# Patient Record
Sex: MALE | Race: WHITE | NOT HISPANIC OR LATINO | Employment: STUDENT | URBAN - METROPOLITAN AREA
[De-identification: names, ages, dates, MRNs, and addresses within clinical notes are randomized per-mention and may not be internally consistent; named-entity substitution may affect disease eponyms.]

---

## 2018-05-16 ENCOUNTER — HOSPITAL ENCOUNTER (INPATIENT)
Facility: HOSPITAL | Age: 23
LOS: 6 days | Discharge: HOME | DRG: 885 | End: 2018-05-22
Attending: PSYCHIATRY & NEUROLOGY | Admitting: PSYCHIATRY & NEUROLOGY
Payer: MEDICAID

## 2018-05-16 DIAGNOSIS — F32.A DEPRESSION WITH SUICIDAL IDEATION: Primary | ICD-10-CM

## 2018-05-16 DIAGNOSIS — R45.851 DEPRESSION WITH SUICIDAL IDEATION: Primary | ICD-10-CM

## 2018-05-16 PROBLEM — Z86.59 HISTORY OF PSYCHOSIS: Status: ACTIVE | Noted: 2018-05-16

## 2018-05-16 PROBLEM — F90.2 ATTENTION DEFICIT HYPERACTIVITY DISORDER (ADHD), COMBINED TYPE: Status: ACTIVE | Noted: 2018-05-16

## 2018-05-16 PROBLEM — F32.9 REACTIVE DEPRESSION: Status: ACTIVE | Noted: 2018-05-16

## 2018-05-16 PROBLEM — E03.8 OTHER SPECIFIED HYPOTHYROIDISM: Status: ACTIVE | Noted: 2018-05-16

## 2018-05-16 PROBLEM — F41.9 ANXIETY: Status: ACTIVE | Noted: 2018-05-16

## 2018-05-16 PROCEDURE — 99223 1ST HOSP IP/OBS HIGH 75: CPT | Mod: ,,, | Performed by: PSYCHIATRY & NEUROLOGY

## 2018-05-16 PROCEDURE — 11400000 HC PSYCH PRIVATE ROOM

## 2018-05-16 PROCEDURE — 25000003 PHARM REV CODE 250: Performed by: PSYCHIATRY & NEUROLOGY

## 2018-05-16 PROCEDURE — 97802 MEDICAL NUTRITION INDIV IN: CPT

## 2018-05-16 PROCEDURE — 99231 SBSQ HOSP IP/OBS SF/LOW 25: CPT | Mod: ,,, | Performed by: NURSE PRACTITIONER

## 2018-05-16 PROCEDURE — 90833 PSYTX W PT W E/M 30 MIN: CPT | Mod: ,,, | Performed by: PSYCHIATRY & NEUROLOGY

## 2018-05-16 RX ORDER — ARIPIPRAZOLE 5 MG/1
15 TABLET ORAL DAILY
Status: DISCONTINUED | OUTPATIENT
Start: 2018-05-16 | End: 2018-05-21

## 2018-05-16 RX ORDER — HYDROXYZINE PAMOATE 50 MG/1
50 CAPSULE ORAL EVERY 6 HOURS PRN
Status: DISCONTINUED | OUTPATIENT
Start: 2018-05-16 | End: 2018-05-22 | Stop reason: HOSPADM

## 2018-05-16 RX ORDER — ACETAMINOPHEN 325 MG/1
650 TABLET ORAL EVERY 6 HOURS PRN
Status: DISCONTINUED | OUTPATIENT
Start: 2018-05-16 | End: 2018-05-22 | Stop reason: HOSPADM

## 2018-05-16 RX ORDER — LEVOTHYROXINE SODIUM 25 UG/1
25 TABLET ORAL
Status: DISCONTINUED | OUTPATIENT
Start: 2018-05-17 | End: 2018-05-22 | Stop reason: HOSPADM

## 2018-05-16 RX ORDER — FOLIC ACID 1 MG/1
1 TABLET ORAL DAILY
Status: DISCONTINUED | OUTPATIENT
Start: 2018-05-16 | End: 2018-05-22 | Stop reason: HOSPADM

## 2018-05-16 RX ORDER — MAG HYDROX/ALUMINUM HYD/SIMETH 200-200-20
30 SUSPENSION, ORAL (FINAL DOSE FORM) ORAL EVERY 6 HOURS PRN
Status: DISCONTINUED | OUTPATIENT
Start: 2018-05-16 | End: 2018-05-22 | Stop reason: HOSPADM

## 2018-05-16 RX ORDER — LOPERAMIDE HYDROCHLORIDE 2 MG/1
2 CAPSULE ORAL
Status: DISCONTINUED | OUTPATIENT
Start: 2018-05-16 | End: 2018-05-22 | Stop reason: HOSPADM

## 2018-05-16 RX ORDER — DOCUSATE SODIUM 100 MG/1
100 CAPSULE, LIQUID FILLED ORAL DAILY PRN
Status: DISCONTINUED | OUTPATIENT
Start: 2018-05-16 | End: 2018-05-22 | Stop reason: HOSPADM

## 2018-05-16 RX ORDER — VENLAFAXINE HYDROCHLORIDE 75 MG/1
75 CAPSULE, EXTENDED RELEASE ORAL DAILY
Status: DISCONTINUED | OUTPATIENT
Start: 2018-05-16 | End: 2018-05-21

## 2018-05-16 RX ORDER — OLANZAPINE 10 MG/1
10 TABLET ORAL EVERY 4 HOURS PRN
Status: DISCONTINUED | OUTPATIENT
Start: 2018-05-16 | End: 2018-05-22 | Stop reason: HOSPADM

## 2018-05-16 RX ORDER — OLANZAPINE 10 MG/2ML
10 INJECTION, POWDER, FOR SOLUTION INTRAMUSCULAR EVERY 4 HOURS PRN
Status: DISCONTINUED | OUTPATIENT
Start: 2018-05-16 | End: 2018-05-22 | Stop reason: HOSPADM

## 2018-05-16 RX ADMIN — ARIPIPRAZOLE 15 MG: 5 TABLET ORAL at 02:05

## 2018-05-16 RX ADMIN — THERA TABS 1 TABLET: TAB at 12:05

## 2018-05-16 RX ADMIN — VENLAFAXINE HYDROCHLORIDE 75 MG: 75 CAPSULE, EXTENDED RELEASE ORAL at 02:05

## 2018-05-16 RX ADMIN — FOLIC ACID 1 MG: 1 TABLET ORAL at 12:05

## 2018-05-16 RX ADMIN — HYDROXYZINE PAMOATE 50 MG: 50 CAPSULE ORAL at 09:05

## 2018-05-16 NOTE — SUBJECTIVE & OBJECTIVE
Past Medical History:   Diagnosis Date    ADHD     Anxiety     Autism     Depression     Disruptive behavior disorder     History of psychiatric hospitalization     Hx of psychiatric care     Psychiatric problem     Suicide attempt     Therapy        Past Surgical History:   Procedure Laterality Date    EYE SURGERY         Review of patient's allergies indicates:   Allergen Reactions    Dilantin [phenytoin sodium extended] Rash       Current Facility-Administered Medications on File Prior to Encounter   Medication    [DISCONTINUED] acetaminophen tablet 650 mg    [DISCONTINUED] diphenhydrAMINE injection 50 mg    [DISCONTINUED] haloperidol lactate injection 5 mg    [DISCONTINUED] lorazepam injection 2 mg     Current Outpatient Prescriptions on File Prior to Encounter   Medication Sig    ARIPiprazole (ABILIFY) 10 MG Tab Take 10 mg by mouth once daily.     doxycycline (DORYX) 100 MG EC tablet Take 100 mg by mouth once daily.    FLUoxetine (PROZAC) 40 MG capsule Take 40 mg by mouth once daily.    lisdexamfetamine (VYVANSE) 70 MG capsule Take 70 mg by mouth every morning.     Family History     Problem Relation (Age of Onset)    Bipolar disorder Mother        Social History Main Topics    Smoking status: Never Smoker    Smokeless tobacco: Never Used    Alcohol use No    Drug use: No    Sexual activity: Not on file     Review of Systems   Constitutional: Negative for chills and fever.   HENT: Negative for congestion, postnasal drip and sore throat.    Eyes: Negative for photophobia.   Respiratory: Negative for chest tightness and shortness of breath.    Cardiovascular: Negative for chest pain.   Gastrointestinal: Negative for abdominal distention, abdominal pain, blood in stool and vomiting.   Genitourinary: Negative for dysuria, flank pain and hematuria.   Musculoskeletal: Negative for back pain.   Skin: Negative for pallor.   Neurological: Negative for dizziness, seizures, facial asymmetry,  speech difficulty and numbness.   Hematological: Does not bruise/bleed easily.   Psychiatric/Behavioral: Negative for agitation and suicidal ideas. The patient is not nervous/anxious.      Objective:     Vital Signs (Most Recent):  Temp: 97.4 °F (36.3 °C) (05/16/18 1039)  Pulse: 82 (05/16/18 1039)  Resp: 20 (05/16/18 1039)  BP: 108/73 (05/16/18 1039) Vital Signs (24h Range):  Temp:  [97.4 °F (36.3 °C)-97.6 °F (36.4 °C)] 97.4 °F (36.3 °C)  Pulse:  [78-82] 82  Resp:  [18-20] 20  SpO2:  [99 %] 99 %  BP: (108-122)/(69-79) 108/73     Weight: 80.3 kg (177 lb)  Body mass index is 30.38 kg/m².    Physical Exam   Constitutional: He is oriented to person, place, and time. He appears well-developed and well-nourished.   HENT:   Head: Normocephalic and atraumatic.   Neck: Normal range of motion. Neck supple. No thyromegaly present.   Cardiovascular: Normal rate, regular rhythm, normal heart sounds and intact distal pulses.    No murmur heard.  Pulmonary/Chest: Effort normal and breath sounds normal. No respiratory distress. He has no wheezes. He has no rales.   Abdominal: Soft. Bowel sounds are normal. He exhibits no distension. There is no tenderness.   Musculoskeletal: Normal range of motion. He exhibits no edema or deformity.   Neurological: He is alert and oriented to person, place, and time.   Neuro: Cranial nerves:  CN II Visual fields full to confrontation.   CN III, IV, VI Pupils are equal, round, and reactive to light.  CN III: no palsy  Nystagmus: none   Diplopia: none  Ophthalmoparesis: none  CN V Facial sensation intact.   CN VII Facial expression full, symmetric.   CN VIII normal.   CN IX normal.   CN X normal.   CN XI normal.   CN XII normal.     Skin: Skin is warm and dry.   Psychiatric: He has a normal mood and affect. His behavior is normal. Thought content normal.   Nursing note and vitals reviewed.      Significant Labs:  U/A  Results for orders placed or performed during the hospital encounter of 05/15/18    Urinalysis   Result Value Ref Range    Specimen UA Urine, Clean Catch     Color, UA Yellow Yellow, Straw, Sangita    Appearance, UA Hazy (A) Clear    pH, UA 7.0 5.0 - 8.0    Specific Gravity, UA 1.025 1.005 - 1.030    Protein, UA Negative Negative    Glucose, UA Negative Negative    Ketones, UA Negative Negative    Bilirubin (UA) Negative Negative    Occult Blood UA Negative Negative    Nitrite, UA Negative Negative    Urobilinogen, UA Negative <2.0 EU/dL    Leukocytes, UA Negative Negative     UDS  Results for orders placed or performed during the hospital encounter of 05/15/18   Drug screen panel, emergency   Result Value Ref Range    Benzodiazepines Negative     Methadone metabolites Negative     Cocaine (Metab.) Negative     Opiate Scrn, Ur Negative     Barbiturate Screen, Ur Negative     Amphetamine Screen, Ur Presumptive Positive     THC Negative     Phencyclidine Negative     Creatinine, Random Ur 196.5 23.0 - 375.0 mg/dL    Toxicology Information SEE COMMENT      CBC  Results for orders placed or performed during the hospital encounter of 05/15/18   CBC auto differential   Result Value Ref Range    WBC 7.51 3.90 - 12.70 K/uL    RBC 5.68 4.60 - 6.20 M/uL    Hemoglobin 16.5 14.0 - 18.0 g/dL    Hematocrit 46.7 40.0 - 54.0 %    MCV 82 82 - 98 fL    MCH 29.0 27.0 - 31.0 pg    MCHC 35.3 32.0 - 36.0 g/dL    RDW 12.5 11.5 - 14.5 %    Platelets 236 150 - 350 K/uL    MPV 9.8 9.2 - 12.9 fL    Gran # (ANC) 4.3 1.8 - 7.7 K/uL    Lymph # 2.3 1.0 - 4.8 K/uL    Mono # 0.8 0.3 - 1.0 K/uL    Eos # 0.1 0.0 - 0.5 K/uL    Baso # 0.01 0.00 - 0.20 K/uL    Gran% 57.7 38.0 - 73.0 %    Lymph% 30.6 18.0 - 48.0 %    Mono% 10.1 4.0 - 15.0 %    Eosinophil% 1.5 0.0 - 8.0 %    Basophil% 0.1 0.0 - 1.9 %    Differential Method Automated      CMP  Results for orders placed or performed during the hospital encounter of 05/15/18   Comprehensive metabolic panel   Result Value Ref Range    Sodium 143 136 - 145 mmol/L    Potassium 4.4 3.5 - 5.1  mmol/L    Chloride 109 95 - 110 mmol/L    CO2 24 23 - 29 mmol/L    Glucose 81 70 - 110 mg/dL    BUN, Bld 16 6 - 20 mg/dL    Creatinine 1.1 0.5 - 1.4 mg/dL    Calcium 9.9 8.7 - 10.5 mg/dL    Total Protein 7.6 6.0 - 8.4 g/dL    Albumin 4.3 3.5 - 5.2 g/dL    Total Bilirubin 0.8 0.1 - 1.0 mg/dL    Alkaline Phosphatase 50 (L) 55 - 135 U/L    AST 32 10 - 40 U/L    ALT 42 10 - 44 U/L    Anion Gap 10 8 - 16 mmol/L    eGFR if African American >60 >60 mL/min/1.73 m^2    eGFR if non African American >60 >60 mL/min/1.73 m^2     TSH  Results for orders placed or performed during the hospital encounter of 05/15/18   TSH   Result Value Ref Range    TSH 5.438 (H) 0.400 - 4.000 uIU/mL     ETOH  Results for orders placed or performed during the hospital encounter of 05/15/18   Ethanol   Result Value Ref Range    Alcohol, Medical, Serum <10 <10 mg/dL     Salicylate  Results for orders placed or performed during the hospital encounter of 05/15/18   Salicylate level   Result Value Ref Range    Salicylate Lvl <5.0 (L) 15.0 - 30.0 mg/dL     Acetaminophen  Results for orders placed or performed during the hospital encounter of 05/15/18   Acetaminophen level   Result Value Ref Range    Acetaminophen (Tylenol), Serum <3.0 (L) 10.0 - 20.0 ug/mL

## 2018-05-16 NOTE — CONSULTS
[facility] Hospital Medicine  Consult Note    Patient Name: Addison Sarmiento  MRN: [id number]  Admission Date: 5/16/2018  Hospital Length of Stay: 0 days  Attending Physician: Rubin Bernard MD   Primary Care Provider: Blanche Leach MD           Patient information was obtained from patient and ER records.     Inpatient consult to [facility] for History and Physical  Consult performed by: MEKA PAULSON  Consult ordered by: RUBIN BERNARD        Subjective:     Principal Problem: <principal problem not specified>    Chief Complaint: No chief complaint on file.       HPI: Pt presented to ER with C/o suicidal ideation. Admitted to [facility]. Medicine consulted for H/P. VSS/afebrile. labs reviewed +amphetamines    Past Medical History:   Diagnosis Date    ADHD     Anxiety     Autism     Depression     Disruptive behavior disorder     History of psychiatric hospitalization     Hx of psychiatric care     Psychiatric problem     Suicide attempt     Therapy        Past Surgical History:   Procedure Laterality Date    EYE SURGERY         Review of patient's allergies indicates:   Allergen Reactions    Dilantin [phenytoin sodium extended] Rash       Current Facility-Administered Medications on File Prior to Encounter   Medication    [DISCONTINUED] acetaminophen tablet 650 mg    [DISCONTINUED] diphenhydrAMINE injection 50 mg    [DISCONTINUED] haloperidol lactate injection 5 mg    [DISCONTINUED] lorazepam injection 2 mg     Current Outpatient Prescriptions on File Prior to Encounter   Medication Sig    ARIPiprazole (ABILIFY) 10 MG Tab Take 10 mg by mouth once daily.     doxycycline (DORYX) 100 MG EC tablet Take 100 mg by mouth once daily.    FLUoxetine (PROZAC) 40 MG capsule Take 40 mg by mouth once daily.    lisdexamfetamine (VYVANSE) 70 MG capsule Take 70 mg by mouth every morning.     Family History     Problem Relation (Age of Onset)    Bipolar disorder Mother         Social History Main Topics    Smoking status: Never Smoker    Smokeless tobacco: Never Used    Alcohol use No    Drug use: No    Sexual activity: Not on file     Review of Systems   Constitutional: Negative for chills and fever.   HENT: Negative for congestion, postnasal drip and sore throat.    Eyes: Negative for photophobia.   Respiratory: Negative for chest tightness and shortness of breath.    Cardiovascular: Negative for chest pain.   Gastrointestinal: Negative for abdominal distention, abdominal pain, blood in stool and vomiting.   Genitourinary: Negative for dysuria, flank pain and hematuria.   Musculoskeletal: Negative for back pain.   Skin: Negative for pallor.   Neurological: Negative for dizziness, seizures, facial asymmetry, speech difficulty and numbness.   Hematological: Does not bruise/bleed easily.   Psychiatric/Behavioral: Negative for agitation and suicidal ideas. The patient is not nervous/anxious.      Objective:     Vital Signs (Most Recent):  Temp: 97.4 °F (36.3 °C) (05/16/18 1039)  Pulse: 82 (05/16/18 1039)  Resp: 20 (05/16/18 1039)  BP: 108/73 (05/16/18 1039) Vital Signs (24h Range):  Temp:  [97.4 °F (36.3 °C)-97.6 °F (36.4 °C)] 97.4 °F (36.3 °C)  Pulse:  [78-82] 82  Resp:  [18-20] 20  SpO2:  [99 %] 99 %  BP: (108-122)/(69-79) 108/73     Weight: 80.3 kg (177 lb)  Body mass index is 30.38 kg/m².    Physical Exam   Constitutional: He is oriented to person, place, and time. He appears well-developed and well-nourished.   HENT:   Head: Normocephalic and atraumatic.   Neck: Normal range of motion. Neck supple. No thyromegaly present.   Cardiovascular: Normal rate, regular rhythm, normal heart sounds and intact distal pulses.    No murmur heard.  Pulmonary/Chest: Effort normal and breath sounds normal. No respiratory distress. He has no wheezes. He has no rales.   Abdominal: Soft. Bowel sounds are normal. He exhibits no distension. There is no tenderness.   Musculoskeletal: Normal range of  motion. He exhibits no edema or deformity.   Neurological: He is alert and oriented to person, place, and time.   Neuro: Cranial nerves:  CN II Visual fields full to confrontation.   CN III, IV, VI Pupils are equal, round, and reactive to light.  CN III: no palsy  Nystagmus: none   Diplopia: none  Ophthalmoparesis: none  CN V Facial sensation intact.   CN VII Facial expression full, symmetric.   CN VIII normal.   CN IX normal.   CN X normal.   CN XI normal.   CN XII normal.     Skin: Skin is warm and dry.   Psychiatric: He has a normal mood and affect. His behavior is normal. Thought content normal.   Nursing note and vitals reviewed.      Significant Labs:  U/A  Results for orders placed or performed during the hospital encounter of 05/15/18   Urinalysis   Result Value Ref Range    Specimen UA Urine, Clean Catch     Color, UA Yellow Yellow, Straw, [place]    Appearance, UA Hazy (A) Clear    pH, UA 7.0 5.0 - 8.0    Specific Gravity, UA 1.025 1.005 - 1.030    Protein, UA Negative Negative    Glucose, UA Negative Negative    Ketones, UA Negative Negative    Bilirubin (UA) Negative Negative    Occult Blood UA Negative Negative    Nitrite, UA Negative Negative    Urobilinogen, UA Negative <2.0 EU/dL    Leukocytes, UA Negative Negative     UDS  Results for orders placed or performed during the hospital encounter of 05/15/18   Drug screen panel, emergency   Result Value Ref Range    Benzodiazepines Negative     Methadone metabolites Negative     Cocaine (Metab.) Negative     Opiate Scrn, Ur Negative     Barbiturate Screen, Ur Negative     Amphetamine Screen, Ur Presumptive Positive     THC Negative     Phencyclidine Negative     Creatinine, Random Ur 196.5 23.0 - 375.0 mg/dL    Toxicology Information SEE COMMENT      CBC  Results for orders placed or performed during the hospital encounter of 05/15/18   CBC auto differential   Result Value Ref Range    WBC 7.51 3.90 - 12.70 K/uL    RBC 5.68 4.60 - 6.20 M/uL    Hemoglobin  16.5 14.0 - 18.0 g/dL    Hematocrit 46.7 40.0 - 54.0 %    MCV 82 82 - 98 fL    MCH 29.0 27.0 - 31.0 pg    MCHC 35.3 32.0 - 36.0 g/dL    RDW 12.5 11.5 - 14.5 %    Platelets 236 150 - 350 K/uL    MPV 9.8 9.2 - 12.9 fL    Gran # (ANC) 4.3 1.8 - 7.7 K/uL    Lymph # 2.3 1.0 - 4.8 K/uL    Mono # 0.8 0.3 - 1.0 K/uL    Eos # 0.1 0.0 - 0.5 K/uL    Baso # 0.01 0.00 - 0.20 K/uL    Gran% 57.7 38.0 - 73.0 %    Lymph% 30.6 18.0 - 48.0 %    Mono% 10.1 4.0 - 15.0 %    Eosinophil% 1.5 0.0 - 8.0 %    Basophil% 0.1 0.0 - 1.9 %    Differential Method Automated      CMP  Results for orders placed or performed during the hospital encounter of 05/15/18   Comprehensive metabolic panel   Result Value Ref Range    Sodium 143 136 - 145 mmol/L    Potassium 4.4 3.5 - 5.1 mmol/L    Chloride 109 95 - 110 mmol/L    CO2 24 23 - 29 mmol/L    Glucose 81 70 - 110 mg/dL    BUN, Bld 16 6 - 20 mg/dL    Creatinine 1.1 0.5 - 1.4 mg/dL    Calcium 9.9 8.7 - 10.5 mg/dL    Total Protein 7.6 6.0 - 8.4 g/dL    Albumin 4.3 3.5 - 5.2 g/dL    Total Bilirubin 0.8 0.1 - 1.0 mg/dL    Alkaline Phosphatase 50 (L) 55 - 135 U/L    AST 32 10 - 40 U/L    ALT 42 10 - 44 U/L    Anion Gap 10 8 - 16 mmol/L    eGFR if African American >60 >60 mL/min/1.73 m^2    eGFR if non African American >60 >60 mL/min/1.73 m^2     TSH  Results for orders placed or performed during the hospital encounter of 05/15/18   TSH   Result Value Ref Range    TSH 5.438 (H) 0.400 - 4.000 uIU/mL     ETOH  Results for orders placed or performed during the hospital encounter of 05/15/18   Ethanol   Result Value Ref Range    Alcohol, Medical, Serum <10 <10 mg/dL     Salicylate  Results for orders placed or performed during the hospital encounter of 05/15/18   Salicylate level   Result Value Ref Range    Salicylate Lvl <5.0 (L) 15.0 - 30.0 mg/dL     Acetaminophen  Results for orders placed or performed during the hospital encounter of 05/15/18   Acetaminophen level   Result Value Ref Range    Acetaminophen  (Tylenol), Serum <3.0 (L) 10.0 - 20.0 ug/mL             Assessment/Plan:     Depression with suicidal ideation    Further orders per psych            VTE Risk Mitigation     None              Thank you for your consult. I will sign off. Please contact us if you have any additional questions.    Annamarie Lombardo NP  Department of Hospital Medicine   [facility]

## 2018-05-16 NOTE — PLAN OF CARE
Problem: Patient Care Overview (Adult)  Goal: Plan of Care Review  Outcome: Ongoing (interventions implemented as appropriate)  Nutrition Goals: adequate po intake >=75% of all meals by discharge  Nutrition Goal Status: new  Communication of RD Recs:  (poc reviewed)    Nutrition Discharge Planning: Regular diet with adequate intake to meet EEN & EPN

## 2018-05-16 NOTE — HPI
Pt presented to ER with C/o suicidal ideation. Admitted to [facility]. Medicine consulted for H/P. VSS/afebrile. labs reviewed +amphetamines

## 2018-05-16 NOTE — CONSULTS
"  [facility]  Adult Nutrition  Consult Note    SUMMARY     Recommendations    Recommendation/Intervention: Continue Regular diet as tolerated encouraging good intake of all meals. Boost plus as needed for intake <50%  Goals: adequate po intake >=75% of all meals by discharge  Nutrition Goal Status: new  Communication of RD Recs:  (poc reviewed)    Nutrition Discharge Planning: Regular diet with adequate intake to meet EEN & EPN    Reason for Assessment    Reason for Assessment: consult  Diagnosis: psychological disorder  Relevant Medical History: Autism, Psyc  General Information Comments: Pt admitted with depression with SI; pt lives in group home.    Nutrition Risk Screen    Nutrition Risk Screen: no indicators present    Nutrition/Diet History    Patient Reported Diet/Restrictions/Preferences: general  Do you have any cultural, spiritual, Sabianism conflicts, given your current situation?: Jain  Food Allergies: NKFA    Anthropometrics    Temp: 97.2 °F (36.2 °C)  Height Method: Measured  Height: 5' 4" (162.6 cm)  Height (inches): 64 in  Weight Method: Standard Scale  Weight: 80.3 kg (177 lb) (rd reviewed)  Weight (lb): 177 lb  Ideal Body Weight (IBW), Male: 130 lb  % Ideal Body Weight, Male (lb): 136.15 lb  BMI (Calculated): 30.4  BMI Grade: 30 - 34.9- obesity - grade I       Lab/Procedures/Meds    Pertinent Labs Reviewed: reviewed  Pertinent Medications Reviewed: reviewed    Physical Findings/Assessment    Overall Physical Appearance: nourished  Tubes:  (-)  Oral/Mouth Cavity: WDL  Skin: intact    Estimated/Assessed Needs    Weight Used For Calorie Calculations: 80.3 kg (177 lb 0.5 oz)  Energy Calorie Requirements (kcal): 1724  Energy Need Method: Brionna-St Weeks (No AF BMI >25)  Protein Requirements: 64-80 (0.8-1.0)  Weight Used For Protein Calculations: 80.3 kg (177 lb 0.5 oz)  Fluid Requirements (mL): 1ml/kcal or per MD     RDA Method (mL): 1724         Nutrition Prescription " Ordered    Current Diet Order: Regular  Nutrition Order Comments: 25% intake (only 1 meal)    Evaluation of Received Nutrient/Fluid Intake    Energy Calories Required: not meeting needs  Protein Required: not meeting needs  Fluid Required: not meeting needs  Tolerance: tolerating  % Intake of Estimated Energy Needs: 25 - 50 %  % Meal Intake: 25 - 50 %    Nutrition Risk    Level of Risk/Frequency of Follow-up:  (F/U 1x/wk)     Assessment and Plan    To be determined    Monitor and Evaluation    Food and Nutrient Intake: food and beverage intake  Food and Nutrient Adminstration: diet order  Knowledge/Beliefs/Attitudes: food and nutrition knowledge/skill  Physical Activity and Function: nutrition-related ADLs and IADLs  Anthropometric Measurements: weight, weight change  Biochemical Data, Medical Tests and Procedures: electrolyte and renal panel  Nutrition-Focused Physical Findings: overall appearance     Nutrition Follow-Up    RD Follow-up?: Yes
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
## 2018-05-17 LAB
CHOLEST SERPL-MCNC: 175 MG/DL
CHOLEST/HDLC SERPL: 5.6 {RATIO}
HDLC SERPL-MCNC: 31 MG/DL
HDLC SERPL: 17.7 %
LDLC SERPL CALC-MCNC: 92.2 MG/DL
NONHDLC SERPL-MCNC: 144 MG/DL
TRIGL SERPL-MCNC: 259 MG/DL

## 2018-05-17 PROCEDURE — 25000003 PHARM REV CODE 250: Performed by: PSYCHIATRY & NEUROLOGY

## 2018-05-17 PROCEDURE — 80061 LIPID PANEL: CPT

## 2018-05-17 PROCEDURE — 36415 COLL VENOUS BLD VENIPUNCTURE: CPT

## 2018-05-17 PROCEDURE — 11400000 HC PSYCH PRIVATE ROOM

## 2018-05-17 PROCEDURE — 99233 SBSQ HOSP IP/OBS HIGH 50: CPT | Mod: ,,, | Performed by: PSYCHIATRY & NEUROLOGY

## 2018-05-17 PROCEDURE — 90833 PSYTX W PT W E/M 30 MIN: CPT | Mod: ,,, | Performed by: PSYCHIATRY & NEUROLOGY

## 2018-05-17 RX ADMIN — LEVOTHYROXINE SODIUM 25 MCG: 25 TABLET ORAL at 05:05

## 2018-05-17 RX ADMIN — HYDROXYZINE PAMOATE 50 MG: 50 CAPSULE ORAL at 08:05

## 2018-05-17 RX ADMIN — ARIPIPRAZOLE 15 MG: 5 TABLET ORAL at 08:05

## 2018-05-17 RX ADMIN — ACETAMINOPHEN 650 MG: 325 TABLET ORAL at 06:05

## 2018-05-17 RX ADMIN — FOLIC ACID 1 MG: 1 TABLET ORAL at 08:05

## 2018-05-17 RX ADMIN — VENLAFAXINE HYDROCHLORIDE 75 MG: 75 CAPSULE, EXTENDED RELEASE ORAL at 08:05

## 2018-05-17 RX ADMIN — THERA TABS 1 TABLET: TAB at 08:05
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
## 2018-05-17 NOTE — PLAN OF CARE
Problem: Patient Care Overview (Adult)  Goal: Plan of Care Review  Outcome: Ongoing (interventions implemented as appropriate)  Shift note : this morning mason was at at the nurses station asking lots of questions . Later he became involved with his peers playing games in the day room . He is eating all meals and taking all ordered  Medications .
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
## 2018-05-17 NOTE — PLAN OF CARE
Problem: Overarching Goals (Adult)  Goal: Optimized Coping Skills in Response to Life Stressors  Group Note  Behavior:  Patient attended Psychotherapy Group and participated. Patient attentive and cooperative.      Intervention:  Relationships- group therapy session with materials prescribed in the Group Treatment for Substance abuse second edition, A Zexdgn-wh-Ovnnvr Therapy  Manual. The materials were used from Precontemplation/Contemplation/ Preparations Session 9  discussing relationships (pp. 115-119). Discussed different types of relationships and how relationships can change over time. Group participated in 'shared story' exercise.    Response:  Patient states he has several good relationships including his mother, grandmother, sisters..  States he needs to work on himself.     Plan:  Will continue to encourage patient participation in group.

## 2018-05-17 NOTE — PROGRESS NOTES
"PSYCHIATRY DAILY INPATIENT PROGRESS NOTE  SUBSEQUENT HOSPITAL VISIT    ENCOUNTER DATE: 5/17/2018  SITE: [facility]    DATE OF ADMISSION: 5/16/2018 10:28 AM  LENGTH OF STAY: 1 days      HISTORY    CHIEF COMPLAINT   Addison Sarmiento is a 22 y.o. male, seen during daily [place] rounds on the inpatient unit.  Addison Sarmiento presents with the chief complaint of aggression and suicidal ideation. "I was thinking about hurting myself."    HPI   (Elements: Location, Quality, Severity, Duration, Timing, Content, Modifying Factors, Associated Signs & Symptoms)    The patient was seen and examined. The chart was reviewed.    Staff reports no behavioral or management issues.     The patient has been compliant with treatment. The patient denied any side effects.    Continued but less Symptoms of Depression: no diminished mood or loss of interest/anhedonia; no irritability, no diminished energy, no change in sleep, no change in appetite; +diminished concentration or cognition or indecisiveness (likely at baseline), less guilt and worthlessness; +PMA (no PMR; PMA is likely at baseline and secondary to ADHD hyperactivity); less suicidal ideations     Denied change sin Sleep: sleep WNL; no difficulty with initiation, maintenance, or early morning awakening with inability to return to sleep     Less Suicidal/(no)Homicidal ideations: less active/passive ideations, with no organized plans, no future intentions     Denied current Symptoms of psychosis: denies hallucinations, delusions, disorganized thinking, disorganized behavior or abnormal motor behavior, or negative symptoms; h/o pf psychosis reported in the past, but he could not describe it further     Denied past or current Symptoms of jovan or hypomania: no symptoms of jovan including elevated, expansive, or irritable mood with increased energy or activity; with no inflated self-esteem or grandiosity, decreased need for sleep, increased rate of speech, FOI or racing " thoughts, distractibility, increased goal directed activity or PMA, or risky/disinhibited behavior     Continued but less Symptoms of VINICIUS: less excessive anxiety/worry/fear; with less restlessness, no fatigue, no poor concentration, no irritability, no muscle tension, no sleep disturbance at times       PSYCHOTHERAPY ADD-ON +77003   30 (16-37*) minutes    Time: 16 minutes  Participants: Met with patient    Therapeutic Intervention Type: behavior modifying psychotherapy, supportive psychotherapy  Why chosen therapy is appropriate versus another modality: relevant to diagnosis, patient responds to this modality, evidence based practice    Target symptoms: depression, anxiety   Primary focus: anger management  Psychotherapeutic techniques: supportive, behavioral techniques; psycho-education; anger management skills    Outcome monitoring methods: self-report, observation    Patient's response to intervention:  The patient's response to intervention is accepting.    Progress toward goals:  The patient's progress toward goals is good.      ROS  General ROS: negative  Ophthalmic ROS: negative  ENT ROS: negative  Allergy and Immunology ROS: negative  Hematological and Lymphatic ROS: negative  Endocrine ROS: negative  Respiratory ROS: no cough, shortness of breath, or wheezing  Cardiovascular ROS: no chest pain or dyspnea on exertion  Gastrointestinal ROS: no abdominal pain, change in bowel habits, or black or bloody stools  Genito-Urinary ROS: no dysuria, trouble voiding, or hematuria  Musculoskeletal ROS: negative  Neurological ROS: no TIA or stroke symptoms  Dermatological ROS: negative    PAST MEDICAL HISTORY   Past Medical History:   Diagnosis Date    ADHD     Anxiety     Autism     Depression     Disruptive behavior disorder     History of psychiatric hospitalization     Hx of psychiatric care     Psychiatric problem     Suicide attempt     Therapy            PSYCHOTROPIC MEDICATIONS   Scheduled Meds:    "ARIPiprazole  15 mg Oral Daily    folic acid  1 mg Oral Daily    levothyroxine  25 mcg Oral Before breakfast    multivitamin  1 tablet Oral Daily    venlafaxine  75 mg Oral Daily     Continuous Infusions:  PRN Meds:.acetaminophen, aluminum-magnesium hydroxide-simethicone, docusate sodium, hydrOXYzine pamoate, loperamide, OLANZapine **AND** OLANZapine        EXAMINATION    VITALS   Vitals:    05/16/18 2100   BP: 109/66   Pulse: 75   Resp: 16   Temp: 97.3 °F (36.3 °C)       CONSTITUTIONAL  General Appearance: WM, in hospital garb; NAD     MUSCULOSKELETAL  Muscle Strength and Tone:  normal  Abnormal Involuntary Movements:  none  Gait and Station:  normal; non-ataxic     PSYCHIATRIC   Level of Consciousness: awake, alert  Orientation: p/p/t/s  Grooming:  adequate to circumstances  Psychomotor Behavior: + PMA, no PMR  Speech: nl r/t/v/s  Language:  English fluent  Mood: "ok"  Affect: oddly related, mood-congruent, less anxious  Thought Process:  linear and organized, somewhat concrete  Associations:  intact; no KINGS  Thought Content:  denied AVH/delusions; denied HI, less SI  Memory:  intact to recent and remote events  Attention: mostly intact to conversation but somewhat distractible   Fund of Knowledge: below average age and education appropriate  Estimate if Intelligence: below average based on work/education history, vocabulary and mental status exam  Insight: limited but improving- seeks help,some awareness of problems and motivations  Judgment:  improving- no bx issues, compliant and cooperative since admission; less recent aggression          DIAGNOSTIC TESTING   Laboratory Results  Recent Results (from the past 24 hour(s))   Lipid panel    Collection Time: 05/17/18  6:51 AM   Result Value Ref Range    Cholesterol 175 120 - 199 mg/dL    Triglycerides 259 (H) 30 - 150 mg/dL    HDL 31 (L) 40 - 75 mg/dL    LDL Cholesterol 92.2 63.0 - 159.0 mg/dL    HDL/Chol Ratio 17.7 (L) 20.0 - 50.0 %    Total Cholesterol/HDL " Ratio 5.6 (H) 2.0 - 5.0    Non-HDL Cholesterol 144 mg/dL         ASSESSMENT      IMPRESSION   Unspecified Depressive Disorder  Unspecified Anxiety Disorder  H/o Unspecified Psychotic Disorder  ADHD, combined type     Vitamin D insufficiency   Hypothyroidism               MEDICAL DECISION MAKING        PROBLEM LIST AND MANAGEMENT PLANS     Depression  Anxiety  H/o psychosis  ADHD  Vitamin D insufficiency   Hypothyroidism      PRESCRIPTION DRUG MANAGEMENT  Compliance: yes  Side Effects: no  Regimen Adjustments:     Depression: Switched Prozac to Effexor; Continue 75 mg po q day; continue Ablify as below     Anxiety: effexor as above; vistaril prn     H/o psychosis: increased home dose of Abilify; Continue Abilify 15 mg po q day     ADHD: Abilify off-label; hold vyvanse for now- pt may resume as an outpatient     Vitamin D insufficiency: Vitamin D3 50,000 units po q week x 8 weeks (1/8 completed)     Hypothyroidism: Continue Synthroid 25 mcg po q AM     DIAGNOSTIC TESTING  Labs reviewed; follow up pending labs     Disposition:  -SW to assist with aftercare planning and collateral  -Once stable discharge home with outpatient follow up care and/or rehab  -Continue inpatient treatment under a PEC and/or CEC for danger to self,  danger to others, and grave disability as evident by depression with active Si and recent aggression/violence     NEED FOR CONTINUED HOSPITALIZATION  Psychiatric illness continues to pose a potential threat to life or bodily function, of self or others, thereby requiring the need for continued inpatient psychiatric hospitalization: Yes    Protective inpatient pyschiatric hospitalization required while a safe disposition plan is enacted: Yes    Patient stabilized and ready for discharge from inpatient psychiatric unit: No      STAFF:   Rubin Bernard MD  Psychiatry

## 2018-05-17 NOTE — PLAN OF CARE
Problem: Patient Care Overview (Adult)  Goal: Plan of Care Review  Outcome: Ongoing (interventions implemented as appropriate)  Plan of care reviewed with patient, patient states he agrees with plan.  Denies suicidal ideations and homicidal ideations. Lives in group home. Interacts with staff and peers.  Calm and cooperative. Accepts meals, snacks and medications. Gait steady, no falls.  Clear pathways and clutter-free environment maintained.  Promoted an individualized safety plan, effective coping strategies, impulse control, and motivators to improve mood, resolve depression and suicidal ideation.  Will continue to monitor for safety.

## 2018-05-17 NOTE — PLAN OF CARE
Patient resting in bed, eyes closed, respirations even and unlabored. No distress noted. Has slept 7.5 hours so far tonight. Will continue to do safety checks every fifteen minutes.
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
## 2018-05-18 PROCEDURE — 25000003 PHARM REV CODE 250: Performed by: PSYCHIATRY & NEUROLOGY

## 2018-05-18 PROCEDURE — 11400000 HC PSYCH PRIVATE ROOM

## 2018-05-18 PROCEDURE — 99233 SBSQ HOSP IP/OBS HIGH 50: CPT | Mod: ,,, | Performed by: PSYCHIATRY & NEUROLOGY

## 2018-05-18 PROCEDURE — 90833 PSYTX W PT W E/M 30 MIN: CPT | Mod: ,,, | Performed by: PSYCHIATRY & NEUROLOGY

## 2018-05-18 RX ORDER — PROPRANOLOL HYDROCHLORIDE 10 MG/1
10 TABLET ORAL 2 TIMES DAILY
Status: DISCONTINUED | OUTPATIENT
Start: 2018-05-18 | End: 2018-05-21

## 2018-05-18 RX ADMIN — PROPRANOLOL HYDROCHLORIDE 10 MG: 10 TABLET ORAL at 08:05

## 2018-05-18 RX ADMIN — THERA TABS 1 TABLET: TAB at 08:05

## 2018-05-18 RX ADMIN — PROPRANOLOL HYDROCHLORIDE 10 MG: 10 TABLET ORAL at 10:05

## 2018-05-18 RX ADMIN — FOLIC ACID 1 MG: 1 TABLET ORAL at 08:05

## 2018-05-18 RX ADMIN — OLANZAPINE 10 MG: 10 TABLET, FILM COATED ORAL at 02:05

## 2018-05-18 RX ADMIN — ARIPIPRAZOLE 15 MG: 5 TABLET ORAL at 08:05

## 2018-05-18 RX ADMIN — LEVOTHYROXINE SODIUM 25 MCG: 25 TABLET ORAL at 05:05

## 2018-05-18 RX ADMIN — HYDROXYZINE PAMOATE 50 MG: 50 CAPSULE ORAL at 08:05

## 2018-05-18 RX ADMIN — VENLAFAXINE HYDROCHLORIDE 75 MG: 75 CAPSULE, EXTENDED RELEASE ORAL at 08:05

## 2018-05-18 NOTE — PLAN OF CARE
Problem: Patient Care Overview (Adult)  Goal: Plan of Care Review  Outcome: Ongoing (interventions implemented as appropriate)  No falls noted, accepting meals and meds, denies suicidal ideation, mood improved, unorganized thoughts at times, calm and cooperative

## 2018-05-18 NOTE — PLAN OF CARE
Problem: Patient Care Overview (Adult)  Goal: Plan of Care Review  Outcome: Ongoing (interventions implemented as appropriate)  Shift note : patient is cooperative . He enjoys talking to staff and peers . He has become much more appropriate . He is taking all ordered medications and is eating all meals .

## 2018-05-18 NOTE — PSYCH
Pt is restless and intrusive.  Pacing the halls, cannot focus on a single task and invading the personal space of others.  Pt redirected several times.  Administered prn Zyprexa po, pt took med without difficulty.  Will monitor for effective response.
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
## 2018-05-18 NOTE — PSYCH
The patient signed a release of information for [facility]. A Packet was sent. I spoke with Mandy who related that she will review the packet and get back with me on Monday.

## 2018-05-18 NOTE — PLAN OF CARE
Problem: Overarching Goals (Adult)  Goal: Optimized Coping Skills in Response to Life Stressors    Intervention: Promote Effective Coping Strategies  Group Note    Behavior:  Patient attended Psychotherapy Group and participated.     Intervention:  Resilience - patients answered questions about characteristic of resilient people. Discussed dealing with difficult situations, the benefits of resilience and ways to develop resilience     Response:  Patient contributed to the discussion. States his concern for his family makes him feel hopeful. And helping others, self care and positive self talk can help him have a positive view of himself.     Plan:  Will continue to encourage patient participation in group.

## 2018-05-18 NOTE — PLAN OF CARE
Problem: Patient Care Overview (Adult)  Goal: Plan of Care Review  Outcome: Ongoing (interventions implemented as appropriate)  Pt.  Slept 6  Hours  this shift without problems noted. q 15 min safety checks done this shift. Safety and comfort maintained. Cont. Plan.

## 2018-05-18 NOTE — PLAN OF CARE
Problem: Overarching Goals (Adult)  Goal: Develops/Participates in Therapeutic [place] to Support Successful Transition    Intervention: Mutually Develop Transition Plan  Attempted to reach group home [facility]  [phone]. Wrong number. Will try to find another number.
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
## 2018-05-19 LAB — GLUCOSE SERPL-MCNC: 83 MG/DL

## 2018-05-19 PROCEDURE — 84443 ASSAY THYROID STIM HORMONE: CPT

## 2018-05-19 PROCEDURE — 11400000 HC PSYCH PRIVATE ROOM

## 2018-05-19 PROCEDURE — 36415 COLL VENOUS BLD VENIPUNCTURE: CPT

## 2018-05-19 PROCEDURE — 25000003 PHARM REV CODE 250: Performed by: PSYCHIATRY & NEUROLOGY

## 2018-05-19 PROCEDURE — 84439 ASSAY OF FREE THYROXINE: CPT

## 2018-05-19 PROCEDURE — 82947 ASSAY GLUCOSE BLOOD QUANT: CPT

## 2018-05-19 PROCEDURE — 99233 SBSQ HOSP IP/OBS HIGH 50: CPT | Mod: ,,, | Performed by: PSYCHIATRY & NEUROLOGY

## 2018-05-19 RX ADMIN — HYDROXYZINE PAMOATE 50 MG: 50 CAPSULE ORAL at 10:05

## 2018-05-19 RX ADMIN — PROPRANOLOL HYDROCHLORIDE 10 MG: 10 TABLET ORAL at 08:05

## 2018-05-19 RX ADMIN — FOLIC ACID 1 MG: 1 TABLET ORAL at 08:05

## 2018-05-19 RX ADMIN — ARIPIPRAZOLE 15 MG: 5 TABLET ORAL at 08:05

## 2018-05-19 RX ADMIN — LEVOTHYROXINE SODIUM 25 MCG: 25 TABLET ORAL at 06:05

## 2018-05-19 RX ADMIN — HYDROXYZINE PAMOATE 50 MG: 50 CAPSULE ORAL at 08:05

## 2018-05-19 RX ADMIN — THERA TABS 1 TABLET: TAB at 08:05

## 2018-05-19 RX ADMIN — VENLAFAXINE HYDROCHLORIDE 75 MG: 75 CAPSULE, EXTENDED RELEASE ORAL at 08:05

## 2018-05-19 NOTE — PLAN OF CARE
"Problem: Patient Care Overview (Adult)  Goal: Plan of Care Review  Outcome: Ongoing (interventions implemented as appropriate)  Pt visible in milieu most of shift, interacting with staff and peers. Pt interrupts others. Pt denies depression, SI/HI, AH/VH. Pt anxious and was administered Vistaril with mild improvement. Pt intrusive and frequently seeks out staff and at nurse's station for numerous requests. Pt is hyper-verbal with pressured speech. Pt verbalized several times that he was bored and that "no one want to play with me". Pt was instructed on the different activities that he could choose from in the day room, but pt wasn't interested in any of them. VSS. Medication compliant. Pt accepting meals and snacks. Will continue to monitor pt.       "

## 2018-05-19 NOTE — PROGRESS NOTES
"PSYCHIATRY DAILY INPATIENT PROGRESS NOTE  SUBSEQUENT HOSPITAL VISIT    ENCOUNTER DATE: 5/19/2018  SITE: [facility]    DATE OF ADMISSION: 5/16/2018 10:28 AM  LENGTH OF STAY: 3 days      HISTORY    CHIEF COMPLAINT   Addison Sarmiento is a 22 y.o. male, seen during daily [place] rounds on the inpatient unit.  Addison Sarmiento presents with the chief complaint of aggression and suicidal ideation. "I was thinking about hurting myself."    HPI   (Elements: Location, Quality, Severity, Duration, Timing, Content, Modifying Factors, Associated Signs & Symptoms)    The patient was seen and examined. The chart was reviewed.    Staff reports no behavioral or management issues.     The patient has been compliant with treatment. The patient denied any side effects.    Patient with substantially improved akathisia.  He is not visibly restless since taking propranolol.  He hadn't complained of this side effect prior.  He is somewhat concerned about his blood pressure but it remains low normal.  He continues to be very needed and angry at times but is demonstrating good control of anger while on the unit.      Continued but less Symptoms of Depression: no diminished mood or loss of interest/anhedonia; no irritability, no diminished energy, no change in sleep, no change in appetite; +diminished concentration or cognition or indecisiveness (likely at baseline), less guilt and worthlessness; +PMA (no PMR; PMA is likely at baseline and secondary to ADHD hyperactivity); less suicidal ideations     Denied changes in Sleep: sleep WNL; no difficulty with initiation, maintenance, or early morning awakening with inability to return to sleep     Improving Suicidal/(no)Homicidal ideations: lessactive/passive ideations, with no organized plans, no future intentions  Yesterday:  Patient explains that he has ideas of running into a brick wall and choking himself with a  belt     Denied current Symptoms of psychosis: denies hallucinations, " delusions, disorganized thinking, disorganized behavior or abnormal motor behavior, or negative symptoms; h/o pf psychosis reported in the past, but he could not describe it further     Denied past or current Symptoms of jovan or hypomania: no symptoms of jovan including elevated, expansive, or irritable mood with increased energy or activity; with no inflated self-esteem or grandiosity, decreased need for sleep, increased rate of speech, FOI or racing thoughts, distractibility, increased goal directed activity or PMA, or risky/disinhibited behavior     Continued but less Symptoms of VINICIUS: less excessive anxiety/worry/fear; with less restlessness, no fatigue, no poor concentration, no irritability, no muscle tension, no sleep disturbance at times    ROS  General ROS: negative  Ophthalmic ROS: negative  ENT ROS: negative  Allergy and Immunology ROS: negative  Hematological and Lymphatic ROS: negative  Endocrine ROS: negative  Respiratory ROS: no cough, shortness of breath, or wheezing  Cardiovascular ROS: no chest pain or dyspnea on exertion  Gastrointestinal ROS: no abdominal pain, change in bowel habits, or black or bloody stools  Genito-Urinary ROS: no dysuria, trouble voiding, or hematuria  Musculoskeletal ROS: negative  Neurological ROS: no TIA or stroke symptoms  Dermatological ROS: negative    PAST MEDICAL HISTORY   Past Medical History:   Diagnosis Date    ADHD     Anxiety     Autism     Depression     Disruptive behavior disorder     History of psychiatric hospitalization     Hx of psychiatric care     Psychiatric problem     Suicide attempt     Therapy     Thyroid disease            PSYCHOTROPIC MEDICATIONS   Scheduled Meds:   ARIPiprazole  15 mg Oral Daily    folic acid  1 mg Oral Daily    levothyroxine  25 mcg Oral Before breakfast    multivitamin  1 tablet Oral Daily    propranolol  10 mg Oral BID    venlafaxine  75 mg Oral Daily     Continuous Infusions:  PRN Meds:.acetaminophen,  "aluminum-magnesium hydroxide-simethicone, docusate sodium, hydrOXYzine pamoate, loperamide, OLANZapine **AND** OLANZapine        EXAMINATION    VITALS   Vitals:    05/19/18 0803   BP: 103/68   Pulse: 69   Resp: 20   Temp: 97.1 °F (36.2 °C)       CONSTITUTIONAL  General Appearance: WM, in hospital garb; NAD     MUSCULOSKELETAL  Muscle Strength and Tone:  normal  Abnormal Involuntary Movements:  none  Gait and Station:  normal; non-ataxic     PSYCHIATRIC   Level of Consciousness: awake, alert  Orientation: p/p/t/s  Grooming:  adequate to circumstances  Psychomotor Behavior: less PMA, no PMR  Speech: nl r/t/v/s  Language:  English fluent  Mood: "good"  Affect: oddly related, mood-congruent, less anxious  Thought Process:  linear and organized, somewhat concrete  Associations:  intact; no KINGS  Thought Content:  denied AVH/delusions; denied HI, less SI  Memory:  intact to recent and remote events  Attention: mostly intact to conversation but somewhat distractible   Fund of Knowledge: below average age and education appropriate  Estimate if Intelligence: below average based on work/education history, vocabulary and mental status exam  Insight: limited but improving- seeks help,some awareness of problems and motivations  Judgment:  improving- no bx issues, compliant and cooperative since admission; less recent aggression          DIAGNOSTIC TESTING   Laboratory Results  Recent Results (from the past 24 hour(s))   Glucose, fasting    Collection Time: 05/19/18  7:21 AM   Result Value Ref Range    Glucose, Fasting 83 70 - 110 mg/dL         ASSESSMENT      IMPRESSION   Unspecified Depressive Disorder  Unspecified Anxiety Disorder  H/o Unspecified Psychotic Disorder  ADHD, combined type     Vitamin D insufficiency   Hypothyroidism               MEDICAL DECISION MAKING        PROBLEM LIST AND MANAGEMENT PLANS     Depression  Anxiety  H/o psychosis  ADHD  Vitamin D insufficiency   Hypothyroidism      PRESCRIPTION DRUG " MANAGEMENT  Compliance: yes  Side Effects: possible akathisia   Regimen Adjustments:     Depression: Switched Prozac to Effexor; Continue 75 mg po q day; continue Ablify as below     Anxiety: effexor as above; vistaril prn     H/o psychosis: increased home dose of Abilify; Continue Abilify 15 mg po q day  Continue Propranolol 10mg BID for akathisia - monitor for change in blood pressure or mood     ADHD: Abilify off-label; hold vyvanse for now- pt may resume as an outpatient     Vitamin D insufficiency: Vitamin D3 50,000 units po q week x 8 weeks (1/8 completed)     Hypothyroidism: Continue Synthroid 25 mcg po q AM     DIAGNOSTIC TESTING  Labs reviewed; follow up pending labs     Disposition:  -SW to assist with aftercare planning and collateral  -Once stable discharge home with outpatient follow up care and/or rehab  -Continue inpatient treatment under a PEC and/or CEC for danger to self,  danger to others, and grave disability as evident by depression with active Si and recent aggression/violence     NEED FOR CONTINUED HOSPITALIZATION  Psychiatric illness continues to pose a potential threat to life or bodily function, of self or others, thereby requiring the need for continued inpatient psychiatric hospitalization: Yes    Protective inpatient pyschiatric hospitalization required while a safe disposition plan is enacted: Yes    Patient stabilized and ready for discharge from inpatient psychiatric unit: No      STAFF:   Thierry Linares MD  Psychiatry

## 2018-05-19 NOTE — PLAN OF CARE
"Problem: Overarching Goals (Adult)  Goal: Develops/Participates in Therapeutic [place] to Support Successful Transition    Intervention: [facility] Therapeutic [place]  Group Note:    Behavior:  Patient attended group today. He was verbally intrusive of this writer at times and responded to redirection. Patient was easily distracted and had to be reoriented to group topic. Patient then would apologize "I['m sorry".  Patients mood varied from normal to irritable, depending on what he was speaking about.             Intervention:  [facility] facilitated a brief strengths-based therapy group designed help patients explore and identify their strengths and how they can be used effectively. Handout titled "Strengths Exploration" provided by [facility]          Response:  Patient completed handout and shared his responses with the group. He stated "wisdom, empathy and gratitude" were some of his strengths. He stated he would like to work on "open mindedness" "I want to work on that" "I threatened to kill myself".  Patient verbalized that he lives in a group home in [place] ,his [place] lives on the "[facility] and he doen't see them much. Patient then became highly irritable and spoke about helping  others then "being stabbed in the back". Patient raised his voice and grabbed the arms of his chair and began to stand up then sat back down.           Plan:  Encourage continued participation in therapeutic activities.                           "

## 2018-05-19 NOTE — PLAN OF CARE
"Problem: Patient Care Overview (Adult)  Goal: Plan of Care Review  Outcome: Ongoing (interventions implemented as appropriate)  Up and about the unit.  Intrusive, needy.  Frequently at the nurses station for different things.  Frequent redirection needed.  Pt stated, "I know.  I'm bugging you.  I'm sorry."  Compliant with meds.  Safety checks ongoing.        "

## 2018-05-20 PROCEDURE — 99233 SBSQ HOSP IP/OBS HIGH 50: CPT | Mod: ,,, | Performed by: PSYCHIATRY & NEUROLOGY

## 2018-05-20 PROCEDURE — 25000003 PHARM REV CODE 250: Performed by: PSYCHIATRY & NEUROLOGY

## 2018-05-20 PROCEDURE — 11400000 HC PSYCH PRIVATE ROOM

## 2018-05-20 RX ORDER — DIPHENHYDRAMINE HCL 50 MG
50 CAPSULE ORAL NIGHTLY
Status: DISCONTINUED | OUTPATIENT
Start: 2018-05-20 | End: 2018-05-21

## 2018-05-20 RX ADMIN — THERA TABS 1 TABLET: TAB at 08:05

## 2018-05-20 RX ADMIN — FOLIC ACID 1 MG: 1 TABLET ORAL at 08:05

## 2018-05-20 RX ADMIN — LEVOTHYROXINE SODIUM 25 MCG: 25 TABLET ORAL at 05:05

## 2018-05-20 RX ADMIN — PROPRANOLOL HYDROCHLORIDE 10 MG: 10 TABLET ORAL at 08:05

## 2018-05-20 RX ADMIN — ARIPIPRAZOLE 15 MG: 5 TABLET ORAL at 08:05

## 2018-05-20 RX ADMIN — DIPHENHYDRAMINE HYDROCHLORIDE 50 MG: 50 CAPSULE ORAL at 08:05

## 2018-05-20 RX ADMIN — VENLAFAXINE HYDROCHLORIDE 75 MG: 75 CAPSULE, EXTENDED RELEASE ORAL at 08:05

## 2018-05-20 NOTE — PLAN OF CARE
"Problem: Overarching Goals (Adult)  Goal: Optimized Coping Skills in Response to Life Stressors    Intervention: Promote Effective Coping Strategies  Group Note:    Behavior:  The patient attended group and participated appropriately. He was less intrusive than previously noted, and was able to exhibit more control over his emotions.     Intervention:  The counselor implemented a mindfulness group session in which the benefits, varying approaches, and barriers. The group also participated in a progressive muscle relaxation activity.     Response:  He said, "I feel like I can't focus sometimes. People don't always understand that, and they get mad at me." He said he felt much better today after the mindfulness activity.    Plan:  Continue to encourage the patient to participate in therapeutic activities.        "

## 2018-05-20 NOTE — PLAN OF CARE
Problem: Patient Care Overview (Adult)  Goal: Plan of Care Review  Outcome: Ongoing (interventions implemented as appropriate)  Pt. Is maintaining his gains, mood and affect approp. Pt. Noted with early morning awakenings, has slept 6.5  hours  So far this shift. cureently awake resting in bed. q 15 min safety checks done this shift. Safety and comfort maintained. Cont. Plan.

## 2018-05-20 NOTE — PLAN OF CARE
"Problem: Overarching Goals (Adult)  Goal: Develops/Participates in Therapeutic [place] to Support Successful Transition    Intervention: [facility] Therapeutic [place]  1:1 with pt:    Counselor and patient met individually at his request. He said he feels "so guilty for what happened when I came in here" and "I want to go to a different group home." He said he worries that "everyone will give up on me." He said he frequently feels like he is an outcast. Counselor encouraged him to focus on positive coping skills.         "

## 2018-05-20 NOTE — PROGRESS NOTES
"PSYCHIATRY DAILY INPATIENT PROGRESS NOTE  SUBSEQUENT HOSPITAL VISIT    ENCOUNTER DATE: 5/20/2018  SITE: [facility]    DATE OF ADMISSION: 5/16/2018 10:28 AM  LENGTH OF STAY: 4 days      HISTORY    CHIEF COMPLAINT   Addison Sarmiento is a 22 y.o. male, seen during daily [place] rounds on the inpatient unit.  Addison Sarmiento presents with the chief complaint of aggression and suicidal ideation. "I was thinking about hurting myself."    HPI   (Elements: Location, Quality, Severity, Duration, Timing, Content, Modifying Factors, Associated Signs & Symptoms)    The patient was seen and examined. The chart was reviewed.    Staff reports no behavioral or management issues.     The patient has been compliant with treatment. The patient denied any side effects.    Patients akathisia is much improved.  He continues to feel somewhat sedated and possibly from the medication.      Continued but less Symptoms of Depression: no diminished mood or loss of interest/anhedonia; no irritability, no diminished energy, no change in sleep, no change in appetite; +diminished concentration or cognition or indecisiveness (likely at baseline), less guilt and worthlessness; +PMA (no PMR; PMA is likely at baseline and secondary to ADHD hyperactivity); resolved suicidal ideations     Denied changes in Sleep: sleep WNL; no difficulty with initiation, maintenance, or early morning awakening with inability to return to sleep     Improved less Suicidal/(no)Homicidal ideations: less active/passive ideations, with no organized plans, no future intentions    He just had thoughts about hurting another patient he had a confrontation with but those have resolved.       Denied current Symptoms of psychosis: denies hallucinations, delusions, disorganized thinking, disorganized behavior or abnormal motor behavior, or negative symptoms; h/o pf psychosis reported in the past, but he could not describe it further     Denied past or current Symptoms of " jovan or hypomania: no symptoms of jovan including elevated, expansive, or irritable mood with increased energy or activity; with no inflated self-esteem or grandiosity, decreased need for sleep, increased rate of speech, FOI or racing thoughts, distractibility, increased goal directed activity or PMA, or risky/disinhibited behavior     Continued but less Symptoms of VINICIUS: less excessive anxiety/worry/fear; with less restlessness, no fatigue, no poor concentration, no irritability, no muscle tension, no sleep disturbance at times    ROS  General ROS: negative  Ophthalmic ROS: negative  ENT ROS: negative  Allergy and Immunology ROS: negative  Hematological and Lymphatic ROS: negative  Endocrine ROS: negative  Respiratory ROS: no cough, shortness of breath, or wheezing  Cardiovascular ROS: no chest pain or dyspnea on exertion  Gastrointestinal ROS: no abdominal pain, change in bowel habits, or black or bloody stools  Genito-Urinary ROS: no dysuria, trouble voiding, or hematuria  Musculoskeletal ROS: negative  Neurological ROS: dizziness  Dermatological ROS: negative    PAST MEDICAL HISTORY   Past Medical History:   Diagnosis Date    ADHD     Anxiety     Autism     Depression     Disruptive behavior disorder     History of psychiatric hospitalization     Hx of psychiatric care     Psychiatric problem     Suicide attempt     Therapy     Thyroid disease            PSYCHOTROPIC MEDICATIONS   Scheduled Meds:   ARIPiprazole  15 mg Oral Daily    folic acid  1 mg Oral Daily    levothyroxine  25 mcg Oral Before breakfast    multivitamin  1 tablet Oral Daily    propranolol  10 mg Oral BID    venlafaxine  75 mg Oral Daily     Continuous Infusions:  PRN Meds:.acetaminophen, aluminum-magnesium hydroxide-simethicone, docusate sodium, hydrOXYzine pamoate, loperamide, OLANZapine **AND** OLANZapine        EXAMINATION    VITALS   Vitals:    05/20/18 0752   BP: (!) 102/59   Pulse: 71   Resp: 18   Temp: 96.9 °F (36.1 °C)  "      CONSTITUTIONAL  General Appearance: WM, in hospital garb; NAD     MUSCULOSKELETAL  Muscle Strength and Tone:  normal  Abnormal Involuntary Movements:  none  Gait and Station:  normal; non-ataxic     PSYCHIATRIC   Level of Consciousness: awake, alert  Orientation: p/p/t/s  Grooming:  adequate to circumstances  Psychomotor Behavior: less PMA, no PMR  Speech: nl r/t/v/s  Language:  English fluent  Mood: "good"  Affect: oddly related, mood-congruent, less anxious  Thought Process:  linear and organized, somewhat concrete  Associations:  intact; no KINGS  Thought Content:  denied AVH/delusions; denied HI, less SI  Memory:  intact to recent and remote events  Attention: mostly intact to conversation but somewhat distractible   Fund of Knowledge: below average age and education appropriate  Estimate if Intelligence: below average based on work/education history, vocabulary and mental status exam  Insight: limited but improving- seeks help,some awareness of problems and motivations  Judgment:  improving- no bx issues, compliant and cooperative since admission; less recent aggression          DIAGNOSTIC TESTING   Laboratory Results  No results found for this or any previous visit (from the past 24 hour(s)).      ASSESSMENT      IMPRESSION   Unspecified Depressive Disorder  Unspecified Anxiety Disorder  H/o Unspecified Psychotic Disorder  ADHD, combined type     Vitamin D insufficiency   Hypothyroidism               MEDICAL DECISION MAKING        PROBLEM LIST AND MANAGEMENT PLANS     Depression  Anxiety  H/o psychosis  ADHD  Vitamin D insufficiency   Hypothyroidism      PRESCRIPTION DRUG MANAGEMENT  Compliance: yes  Side Effects: possible akathisia   Regimen Adjustments:     Depression: Switched Prozac to Effexor; Continue 75 mg po q day; continue Ablify as below     Anxiety: effexor as above; vistaril prn     H/o psychosis: increased home dose of Abilify; Continue Abilify 15 mg po q day  Continue Propranolol 10mg BID for " akathisia - monitor for change in blood pressure or mood     ADHD: Abilify off-label; hold vyvanse for now- pt may resume as an outpatient     Vitamin D insufficiency: Vitamin D3 50,000 units po q week x 8 weeks (1/8 completed)     Hypothyroidism: Continue Synthroid 25 mcg po q AM     DIAGNOSTIC TESTING  Labs reviewed; follow up pending labs     Disposition:  -SW to assist with aftercare planning and collateral  -Once stable discharge home with outpatient follow up care and/or rehab  -Continue inpatient treatment under a PEC and/or CEC for danger to self,  danger to others, and grave disability as evident by depression with active Si and recent aggression/violence     NEED FOR CONTINUED HOSPITALIZATION  Psychiatric illness continues to pose a potential threat to life or bodily function, of self or others, thereby requiring the need for continued inpatient psychiatric hospitalization: Yes    Protective inpatient pyschiatric hospitalization required while a safe disposition plan is enacted: Yes    Patient stabilized and ready for discharge from inpatient psychiatric unit: No      STAFF:   Thierry Linares MD  Psychiatry

## 2018-05-20 NOTE — PLAN OF CARE
Problem: Patient Care Overview (Adult)  Goal: Plan of Care Review  Outcome: Ongoing (interventions implemented as appropriate)  Pt in and out day room and walking halls most of shift, interacting with staff and peers. Pt is intrusive and was aggravating peers, getting them escalated. Pt had to be redirected several times. Pt at nurse's station frequently with excessive amounts of needs. Pt denies depression, SI/HI, AH/VH. VSS. Medication compliant. Will continue to monitor pt.

## 2018-05-21 PROCEDURE — 99233 SBSQ HOSP IP/OBS HIGH 50: CPT | Mod: ,,, | Performed by: PSYCHIATRY & NEUROLOGY

## 2018-05-21 PROCEDURE — 11400000 HC PSYCH PRIVATE ROOM

## 2018-05-21 PROCEDURE — 25000003 PHARM REV CODE 250: Performed by: PSYCHIATRY & NEUROLOGY

## 2018-05-21 PROCEDURE — 90833 PSYTX W PT W E/M 30 MIN: CPT | Mod: ,,, | Performed by: PSYCHIATRY & NEUROLOGY

## 2018-05-21 RX ORDER — VENLAFAXINE HYDROCHLORIDE 150 MG/1
150 CAPSULE, EXTENDED RELEASE ORAL DAILY
Status: DISCONTINUED | OUTPATIENT
Start: 2018-05-22 | End: 2018-05-22 | Stop reason: HOSPADM

## 2018-05-21 RX ORDER — DIPHENHYDRAMINE HCL 50 MG
100 CAPSULE ORAL NIGHTLY
Status: DISCONTINUED | OUTPATIENT
Start: 2018-05-21 | End: 2018-05-21

## 2018-05-21 RX ORDER — ARIPIPRAZOLE 5 MG/1
15 TABLET ORAL NIGHTLY
Status: DISCONTINUED | OUTPATIENT
Start: 2018-05-21 | End: 2018-05-22 | Stop reason: HOSPADM

## 2018-05-21 RX ADMIN — VENLAFAXINE HYDROCHLORIDE 75 MG: 75 CAPSULE, EXTENDED RELEASE ORAL at 08:05

## 2018-05-21 RX ADMIN — HYDROXYZINE PAMOATE 50 MG: 50 CAPSULE ORAL at 02:05

## 2018-05-21 RX ADMIN — LEVOTHYROXINE SODIUM 25 MCG: 25 TABLET ORAL at 05:05

## 2018-05-21 RX ADMIN — HYDROXYZINE PAMOATE 50 MG: 50 CAPSULE ORAL at 04:05

## 2018-05-21 RX ADMIN — ARIPIPRAZOLE 15 MG: 5 TABLET ORAL at 08:05

## 2018-05-21 RX ADMIN — FOLIC ACID 1 MG: 1 TABLET ORAL at 08:05

## 2018-05-21 RX ADMIN — THERA TABS 1 TABLET: TAB at 08:05

## 2018-05-21 NOTE — PLAN OF CARE
Problem: Overarching Goals (Adult)  Goal: Optimized Coping Skills in Response to Life Stressors    Intervention: Promote Effective Coping Strategies  Group Note  Behavior:  Patient attended Psychotherapy Group and participated. Patient intrusive but redirectable. Patient unhappy because he is to return to current group home.     Intervention:  Group Verbal exercises/coping skills - patient's answered questions about themselves and discussed. Discussed managing stress, positive thinking and utilizing supports.     Response:  Patient states he likes that he is caring, would like to improve his confidence. Patient upset to hear that he will be returning to his current group home. After group sat with patient and discharge planner to discuss his options and the changes that were being made at his home.    Plan:  Will continue to encourage patient participation in group.

## 2018-05-21 NOTE — PLAN OF CARE
Problem: Patient Care Overview (Adult)  Goal: Plan of Care Review  Outcome: Ongoing (interventions implemented as appropriate)  Lying quiet in bed, eyes closed, respiration even and unlabored, appearing asleep at beginning of shift for about 2 hours then was mostly awake for the rest of the shift.. (see previous charting)  Safety and precautions maintained with rounds every 15 minutes, bed is fixed in a low position and pathways kept clear.  No fall occurred.

## 2018-05-21 NOTE — PLAN OF CARE
"Problem: Overarching Goals (Adult)  Goal: Develops/Participates in Therapeutic [place] to Support Successful Transition    Intervention: Mutually Develop Transition Plan  Spoke with Linda Torres patient yajaira, [phone]    She states she has spoke with staff at [facility], patient's current group home, and they are putting things into place for a better environment.  "He will have 1:1 staff when he goes back and they're working on moving him to a private room.    She thinks returning to current group home is best for the patient at this time.   "He needs more intensive treatment than what he's getting at the home presently,  A different psychologist, a different behavior plan and they are working on that. But it is [facility]'s responsibility to take him back;    She states is if patient wants to go to a different home, they can work on that, but, "Wherever he goes he will exhibit this behavior. This home is the only one with men working there. He needs men with him. The people there are also familiar with him.  He needs to go back where services arebest for him right now. They have taken the young girls out of the home and replaced them with older workers or males. We will make sure it is as safe as possible."              "

## 2018-05-21 NOTE — PSYCH
Spoke with Mandy with [facility] who related that she the group home will not be able to accept him as a resident.  Mandy is going to recommend another home; however, we are awaiting a call.

## 2018-05-21 NOTE — PLAN OF CARE
Problem: Patient Care Overview (Adult)  Goal: Plan of Care Review  Outcome: Ongoing (interventions implemented as appropriate)  Shift note : patient is attention seeking of staff and intrusive with peers and staff . He is hyperactive . He is taking all ordered medications and is eating all meals .

## 2018-05-21 NOTE — PROGRESS NOTES
"PSYCHIATRY DAILY INPATIENT PROGRESS NOTE  SUBSEQUENT HOSPITAL VISIT    ENCOUNTER DATE: 5/21/2018  SITE: [facility]    DATE OF ADMISSION: 5/16/2018 10:28 AM  LENGTH OF STAY: 5 days      HISTORY    CHIEF COMPLAINT   Addison Sarmiento is a 22 y.o. male, seen during daily [place] rounds on the inpatient unit.  Addison Sarmiento presents with the chief complaint of aggression and suicidal ideation. "I was thinking about hurting myself."    HPI   (Elements: Location, Quality, Severity, Duration, Timing, Content, Modifying Factors, Associated Signs & Symptoms)    The patient was seen and examined. The chart was reviewed.    Staff reports no behavioral or management issues.     The patient has been compliant with treatment. The patient denied any side effects.    Patients reported no effect from propranolol, but he did reports that it made him feel light headed. His restless pacing has been a problem predating neuroleptics and ongoing for years.     He has had some difficult interactions with peers, but he managed them without violence. He was able to redirect with support. He remains very intrusive which is baseline per collateral information.    Continued but lessening Symptoms of Depression: no diminished mood or loss of interest/anhedonia; no irritability, no diminished energy, no change in sleep, no change in appetite; less diminished concentration or cognition or indecisiveness (likely at baseline), less guilt and worthlessness; +PMA (no PMR; PMA is likely at baseline and secondary to ADHD hyperactivity); resolved suicidal ideations     Denied changes in Sleep: no difficulty with initiation, maintenance, or early morning awakening with inability to return to sleep; however, he only slept 4 hours last night despite the benadryl dosing     Improved/resolved Suicidal/(no)Homicidal ideations: no active/passive ideations, with no organized plans, no future intentions    Denied current Symptoms of psychosis: denies " hallucinations, delusions, disorganized thinking, disorganized behavior or abnormal motor behavior, or negative symptoms; h/o pf psychosis reported in the past, but he could not describe it further     Denied past or current Symptoms of jovan or hypomania: no symptoms of jovan including elevated, expansive, or irritable mood with increased energy or activity; with no inflated self-esteem or grandiosity, decreased need for sleep, increased rate of speech, FOI or racing thoughts, distractibility, increased goal directed activity or PMA, or risky/disinhibited behavior     Continued but lessening Symptoms of VINICIUS: less excessive anxiety/worry/fear; with less restlessness, no fatigue, no poor concentration (baseline concentration is problematic, no irritability, no muscle tension, no sleep disturbance at times    PSYCHOTHERAPY ADD-ON [id number]   30 (16-37*) minutes    Time: 16 minutes  Participants: Met with patient    Therapeutic Intervention Type: insight oriented psychotherapy, behavior modifying psychotherapy, supportive psychotherapy  Why chosen therapy is appropriate versus another modality: relevant to diagnosis, patient responds to this modality, evidence based practice    Target symptoms: mood disorder  Primary focus: anger management  Psychotherapeutic techniques: supportive and behavioral techniques; psycho-education    Outcome monitoring methods: self-report, observation    Patient's response to intervention:  The patient's response to intervention is accepting.    Progress toward goals:  The patient's progress toward goals is limited.          ROS  General ROS: negative  Ophthalmic ROS: negative  ENT ROS: negative  Allergy and Immunology ROS: negative  Hematological and Lymphatic ROS: negative  Endocrine ROS: negative  Respiratory ROS: no cough, shortness of breath, or wheezing  Cardiovascular ROS: no chest pain or dyspnea on exertion  Gastrointestinal ROS: no abdominal pain, change in bowel habits, or black or  "bloody stools  Genito-Urinary ROS: no dysuria, trouble voiding, or hematuria  Musculoskeletal ROS: negative  Neurological ROS: dizziness  Dermatological ROS: negative    PAST MEDICAL HISTORY   Past Medical History:   Diagnosis Date    ADHD     Anxiety     Autism     Depression     Disruptive behavior disorder     History of psychiatric hospitalization     Hx of psychiatric care     Psychiatric problem     Suicide attempt     Therapy     Thyroid disease            PSYCHOTROPIC MEDICATIONS   Scheduled Meds:   ARIPiprazole  15 mg Oral Daily    diphenhydrAMINE  50 mg Oral QHS    folic acid  1 mg Oral Daily    levothyroxine  25 mcg Oral Before breakfast    multivitamin  1 tablet Oral Daily    propranolol  10 mg Oral BID    venlafaxine  75 mg Oral Daily     Continuous Infusions:  PRN Meds:.acetaminophen, aluminum-magnesium hydroxide-simethicone, docusate sodium, hydrOXYzine pamoate, loperamide, OLANZapine **AND** OLANZapine        EXAMINATION    VITALS   Vitals:    05/20/18 2100   BP: 114/69   Pulse: 73   Resp: 18   Temp: 96.6 °F (35.9 °C)       CONSTITUTIONAL  General Appearance: WM, in hospital garb; NAD     MUSCULOSKELETAL  Muscle Strength and Tone: normal  Abnormal Involuntary Movements:  none  Gait and Station:  normal; non-ataxic     PSYCHIATRIC   Level of Consciousness: awake, alert  Orientation: p/p/t/s  Grooming:  adequate to circumstances  Psychomotor Behavior: less PMA, no PMR  Speech: nl r/t/v/s  Language:  English fluent  Mood: "good"  Affect: oddly related, mood-congruent, less anxious  Thought Process:  linear and organized, somewhat concrete  Associations:  intact; no KINGS  Thought Content:  denied AVH/delusions; denied HI, less SI  Memory:  intact to recent and remote events  Attention: mostly intact to conversation but somewhat distractible   Fund of Knowledge: below average age and education appropriate  Estimate if Intelligence: below average based on work/education history, vocabulary " and mental status exam  Insight: limited but improving- seeks help,some awareness of problems and motivations  Judgment:  improving- no bx issues, compliant and cooperative since admission; less recent aggression          DIAGNOSTIC TESTING   Laboratory Results  No results found for this or any previous visit (from the past 24 hour(s)).      ASSESSMENT      IMPRESSION   Unspecified Depressive Disorder  Unspecified Anxiety Disorder  H/o Unspecified Psychotic Disorder  ADHD, combined type     Vitamin D insufficiency   Hypothyroidism               MEDICAL DECISION MAKING        PROBLEM LIST AND MANAGEMENT PLANS     Depression  Anxiety  H/o psychosis  ADHD  Vitamin D insufficiency   Hypothyroidism      PRESCRIPTION DRUG MANAGEMENT  Compliance: yes  Side Effects: possible akathisia   Regimen Adjustments:     Depression: Switched Prozac to Effexor; Increase Effexor to 150 mg po q day; continue Ablify as below     Anxiety: effexor as above; vistaril prn     H/o psychosis: increased home dose of Abilify; Continue Abilify 15 mg po q day     ADHD: Abilify off-label; hold vyvanse for now- pt may resume as an outpatient     Vitamin D insufficiency: Vitamin D3 50,000 units po q week x 8 weeks (1/8 completed)     Hypothyroidism: Continue Synthroid 25 mcg po q AM; check TSH in the AM     DIAGNOSTIC TESTING  Labs reviewed; follow up pending labs     Disposition:  -SW to assist with aftercare planning and collateral  -Once stable discharge home with outpatient follow up care and/or rehab  -Continue inpatient treatment under a P[facility] and/or [facility] for danger to self,  danger to others, and grave disability as evident by depression with active Si and recent aggression/violence     NEED FOR CONTINUED HOSPITALIZATION  Psychiatric illness continues to pose a potential threat to life or bodily function, of self or others, thereby requiring the need for continued inpatient psychiatric hospitalization: Yes    Protective inpatient pyschiatric  hospitalization required while a safe disposition plan is enacted: Yes    Patient stabilized and ready for discharge from inpatient psychiatric unit: No      STAFF:   Rubin Bernard MD  Psychiatry

## 2018-05-21 NOTE — PLAN OF CARE
Problem: Patient Care Overview (Adult)  Goal: Plan of Care Review  Outcome: Ongoing (interventions implemented as appropriate)  Up and about the unit.  In and out of the dayroom, frequently at the nurses station for various needs.  Restless, intrusive and needs frequent redirection.  No angry outbursts from pt even when peers get frustrated with him.  Talkative and especially so with 2 female peers.  No inappropriate behavior noted.  Good appetite.  Compliant with meds.  Participating in unit activities.  Safety checks ongoing.

## 2018-05-22 VITALS
WEIGHT: 178 LBS | BODY MASS INDEX: 30.39 KG/M2 | DIASTOLIC BLOOD PRESSURE: 66 MMHG | SYSTOLIC BLOOD PRESSURE: 109 MMHG | HEIGHT: 64 IN | RESPIRATION RATE: 18 BRPM | HEART RATE: 72 BPM | TEMPERATURE: 97 F

## 2018-05-22 PROBLEM — R45.851 DEPRESSION WITH SUICIDAL IDEATION: Status: RESOLVED | Noted: 2018-05-16 | Resolved: 2018-05-22

## 2018-05-22 PROBLEM — F32.A DEPRESSION WITH SUICIDAL IDEATION: Status: RESOLVED | Noted: 2018-05-16 | Resolved: 2018-05-22

## 2018-05-22 LAB
T4 FREE SERPL-MCNC: 0.96 NG/DL
TSH SERPL DL<=0.005 MIU/L-ACNC: 5.29 UIU/ML

## 2018-05-22 PROCEDURE — 36415 COLL VENOUS BLD VENIPUNCTURE: CPT

## 2018-05-22 PROCEDURE — 25000003 PHARM REV CODE 250: Performed by: PSYCHIATRY & NEUROLOGY

## 2018-05-22 PROCEDURE — 99239 HOSP IP/OBS DSCHRG MGMT >30: CPT | Mod: ,,, | Performed by: PSYCHIATRY & NEUROLOGY

## 2018-05-22 RX ORDER — ARIPIPRAZOLE 15 MG/1
15 TABLET ORAL NIGHTLY
Qty: 30 TABLET | Refills: 0 | Status: SHIPPED | OUTPATIENT
Start: 2018-05-22 | End: 2019-05-22

## 2018-05-22 RX ORDER — VENLAFAXINE HYDROCHLORIDE 150 MG/1
150 CAPSULE, EXTENDED RELEASE ORAL DAILY
Qty: 30 CAPSULE | Refills: 0 | Status: SHIPPED | OUTPATIENT
Start: 2018-05-22 | End: 2019-05-22

## 2018-05-22 RX ORDER — LEVOTHYROXINE SODIUM 25 UG/1
25 TABLET ORAL
Qty: 30 TABLET | Refills: 1 | Status: SHIPPED | OUTPATIENT
Start: 2018-05-23 | End: 2019-05-23

## 2018-05-22 RX ADMIN — LEVOTHYROXINE SODIUM 25 MCG: 25 TABLET ORAL at 05:05

## 2018-05-22 RX ADMIN — VENLAFAXINE HYDROCHLORIDE 150 MG: 150 CAPSULE, EXTENDED RELEASE ORAL at 08:05

## 2018-05-22 RX ADMIN — THERA TABS 1 TABLET: TAB at 08:05

## 2018-05-22 RX ADMIN — FOLIC ACID 1 MG: 1 TABLET ORAL at 08:05

## 2018-05-22 NOTE — PSYCH
Patient will be following up with Alec Beatty MD at [id number] in [place], [phone].  Appointment is on 5/24/2018 at 1:30 pm.  Patient does not use tobacco products and does not use street drugs.  AVS faxed on 5/22/2018 at 11:20 am.

## 2018-05-22 NOTE — PLAN OF CARE
Problem: Patient Care Overview (Adult)  Goal: Plan of Care Review  Outcome: Ongoing (interventions implemented as appropriate)  Pt frequently seeks staff, cooperative, med compliant, appetite good, denies SI or HI, safety precautions maintained, will continue to monitor

## 2018-05-22 NOTE — PLAN OF CARE
Problem: Patient Care Overview (Adult)  Goal: Plan of Care Review  Outcome: Ongoing (interventions implemented as appropriate)  Lying quiet in bed, eyes closed, respiration even and unlabored, appearing asleep.  Slept better this night then last night but still wakes up and is intrusive by asking the same questions over and over again.  Has slept about 5 hours so far.  Safety and precautions maintained with rounds every 15 minutes, bed is fixed in a low position and pathways kept clear.  No fall occurred.
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
## 2018-05-22 NOTE — PLAN OF CARE
Problem: Patient Care Overview (Adult)  Goal: Plan of Care Review  Outcome: Outcome(s) achieved Date Met: 05/22/18  Shift note : patient is stable for discharge home

## 2018-05-22 NOTE — NURSING
Discharge note : patient is cooperative . Denies suicidal , homicidal ideations and is not gravely disabled . avs reviewed with patient and he expresses understanding . Has all belongings . Transportation will bring him back to his group home .

## 2018-05-22 NOTE — PROGRESS NOTES
PSYCHOTHERAPY ADD-ON [id number]   30 (16-37*) minutes      Time: 16 minutes  Participants: Met with patient    Therapeutic Intervention Type: insight oriented psychotherapy, behavior modifying psychotherapy, supportive psychotherapy  Why chosen therapy is appropriate versus another modality: relevant to diagnosis, patient responds to this modality, evidence based practice    Target symptoms: depression, anxiety   Primary focus: anger management  Psychotherapeutic techniques: supportive, behavioral, and anger management techniques; psycho-education    Outcome monitoring methods: self-report, observation    Patient's response to intervention:  The patient's response to intervention is accepting.    Progress toward goals:  The patient's progress toward goals is fair .    Rubin Bernard MD  Psychiatry

## 2019-09-12 ENCOUNTER — HOSPITAL ENCOUNTER (EMERGENCY)
Facility: HOSPITAL | Age: 24
Discharge: PSYCHIATRIC OR SUBSTANCE USE FACILITY | End: 2019-09-13
Attending: EMERGENCY MEDICINE
Payer: MEDICAID

## 2019-09-12 DIAGNOSIS — R45.851 SUICIDAL IDEATION: Primary | ICD-10-CM

## 2019-09-12 PROCEDURE — 80320 DRUG SCREEN QUANTALCOHOLS: CPT

## 2019-09-12 PROCEDURE — 99284 EMERGENCY DEPT VISIT MOD MDM: CPT | Mod: ,,, | Performed by: EMERGENCY MEDICINE

## 2019-09-12 PROCEDURE — 80053 COMPREHEN METABOLIC PANEL: CPT

## 2019-09-12 PROCEDURE — 85025 COMPLETE CBC W/AUTO DIFF WBC: CPT

## 2019-09-12 PROCEDURE — 80329 ANALGESICS NON-OPIOID 1 OR 2: CPT

## 2019-09-12 PROCEDURE — 84443 ASSAY THYROID STIM HORMONE: CPT

## 2019-09-12 PROCEDURE — 81003 URINALYSIS AUTO W/O SCOPE: CPT | Mod: 59

## 2019-09-12 PROCEDURE — 99285 EMERGENCY DEPT VISIT HI MDM: CPT

## 2019-09-12 PROCEDURE — 99284 PR EMERGENCY DEPT VISIT,LEVEL IV: ICD-10-PCS | Mod: ,,, | Performed by: EMERGENCY MEDICINE

## 2019-09-12 PROCEDURE — 80307 DRUG TEST PRSMV CHEM ANLYZR: CPT

## 2019-09-12 PROCEDURE — 84439 ASSAY OF FREE THYROXINE: CPT

## 2019-09-13 VITALS
HEIGHT: 64 IN | TEMPERATURE: 99 F | SYSTOLIC BLOOD PRESSURE: 121 MMHG | BODY MASS INDEX: 30.39 KG/M2 | HEART RATE: 85 BPM | WEIGHT: 178 LBS | OXYGEN SATURATION: 99 % | RESPIRATION RATE: 18 BRPM | DIASTOLIC BLOOD PRESSURE: 87 MMHG

## 2019-09-13 LAB
ALBUMIN SERPL BCP-MCNC: 3.9 G/DL (ref 3.5–5.2)
ALP SERPL-CCNC: 52 U/L (ref 55–135)
ALT SERPL W/O P-5'-P-CCNC: 107 U/L (ref 10–44)
AMPHET+METHAMPHET UR QL: NORMAL
ANION GAP SERPL CALC-SCNC: 11 MMOL/L (ref 8–16)
APAP SERPL-MCNC: <3 UG/ML (ref 10–20)
AST SERPL-CCNC: 44 U/L (ref 10–40)
BARBITURATES UR QL SCN>200 NG/ML: NEGATIVE
BASOPHILS # BLD AUTO: 0.03 K/UL (ref 0–0.2)
BASOPHILS NFR BLD: 0.4 % (ref 0–1.9)
BENZODIAZ UR QL SCN>200 NG/ML: NEGATIVE
BILIRUB SERPL-MCNC: 0.5 MG/DL (ref 0.1–1)
BILIRUB UR QL STRIP: NEGATIVE
BUN SERPL-MCNC: 11 MG/DL (ref 6–20)
BZE UR QL SCN: NEGATIVE
CALCIUM SERPL-MCNC: 9.1 MG/DL (ref 8.7–10.5)
CANNABINOIDS UR QL SCN: NEGATIVE
CHLORIDE SERPL-SCNC: 104 MMOL/L (ref 95–110)
CLARITY UR REFRACT.AUTO: CLEAR
CO2 SERPL-SCNC: 24 MMOL/L (ref 23–29)
COLOR UR AUTO: YELLOW
CREAT SERPL-MCNC: 1.1 MG/DL (ref 0.5–1.4)
CREAT UR-MCNC: 243 MG/DL (ref 23–375)
DIFFERENTIAL METHOD: ABNORMAL
EOSINOPHIL # BLD AUTO: 0.1 K/UL (ref 0–0.5)
EOSINOPHIL NFR BLD: 1.7 % (ref 0–8)
ERYTHROCYTE [DISTWIDTH] IN BLOOD BY AUTOMATED COUNT: 12.7 % (ref 11.5–14.5)
EST. GFR  (AFRICAN AMERICAN): >60 ML/MIN/1.73 M^2
EST. GFR  (NON AFRICAN AMERICAN): >60 ML/MIN/1.73 M^2
ETHANOL SERPL-MCNC: <10 MG/DL
GLUCOSE SERPL-MCNC: 111 MG/DL (ref 70–110)
GLUCOSE UR QL STRIP: NEGATIVE
HCT VFR BLD AUTO: 43.4 % (ref 40–54)
HGB BLD-MCNC: 14.5 G/DL (ref 14–18)
HGB UR QL STRIP: NEGATIVE
IMM GRANULOCYTES # BLD AUTO: 0.05 K/UL (ref 0–0.04)
IMM GRANULOCYTES NFR BLD AUTO: 0.7 % (ref 0–0.5)
KETONES UR QL STRIP: NEGATIVE
LEUKOCYTE ESTERASE UR QL STRIP: NEGATIVE
LYMPHOCYTES # BLD AUTO: 2.6 K/UL (ref 1–4.8)
LYMPHOCYTES NFR BLD: 37.2 % (ref 18–48)
MCH RBC QN AUTO: 29.6 PG (ref 27–31)
MCHC RBC AUTO-ENTMCNC: 33.4 G/DL (ref 32–36)
MCV RBC AUTO: 89 FL (ref 82–98)
METHADONE UR QL SCN>300 NG/ML: NEGATIVE
MONOCYTES # BLD AUTO: 0.7 K/UL (ref 0.3–1)
MONOCYTES NFR BLD: 9.4 % (ref 4–15)
NEUTROPHILS # BLD AUTO: 3.6 K/UL (ref 1.8–7.7)
NEUTROPHILS NFR BLD: 50.6 % (ref 38–73)
NITRITE UR QL STRIP: NEGATIVE
NRBC BLD-RTO: 0 /100 WBC
OPIATES UR QL SCN: NEGATIVE
PCP UR QL SCN>25 NG/ML: NEGATIVE
PH UR STRIP: 6 [PH] (ref 5–8)
PLATELET # BLD AUTO: 217 K/UL (ref 150–350)
PMV BLD AUTO: 9.3 FL (ref 9.2–12.9)
POTASSIUM SERPL-SCNC: 3.8 MMOL/L (ref 3.5–5.1)
PROT SERPL-MCNC: 7.1 G/DL (ref 6–8.4)
PROT UR QL STRIP: NEGATIVE
RBC # BLD AUTO: 4.9 M/UL (ref 4.6–6.2)
SODIUM SERPL-SCNC: 139 MMOL/L (ref 136–145)
SP GR UR STRIP: 1.02 (ref 1–1.03)
T4 FREE SERPL-MCNC: 0.77 NG/DL (ref 0.71–1.51)
TOXICOLOGY INFORMATION: NORMAL
TSH SERPL DL<=0.005 MIU/L-ACNC: 10.42 UIU/ML (ref 0.4–4)
URN SPEC COLLECT METH UR: NORMAL
WBC # BLD AUTO: 7.05 K/UL (ref 3.9–12.7)

## 2019-09-13 RX ORDER — HALOPERIDOL 5 MG/1
5 TABLET ORAL EVERY 4 HOURS PRN
Status: DISCONTINUED | OUTPATIENT
Start: 2019-09-13 | End: 2019-09-13 | Stop reason: HOSPADM

## 2019-09-13 RX ORDER — HALOPERIDOL 5 MG/ML
5 INJECTION INTRAMUSCULAR EVERY 4 HOURS PRN
Status: DISCONTINUED | OUTPATIENT
Start: 2019-09-13 | End: 2019-09-13 | Stop reason: HOSPADM

## 2019-09-13 RX ORDER — ACETAMINOPHEN 325 MG/1
650 TABLET ORAL EVERY 6 HOURS PRN
Status: DISCONTINUED | OUTPATIENT
Start: 2019-09-13 | End: 2019-09-13 | Stop reason: HOSPADM
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
## 2019-09-13 NOTE — ED PROVIDER NOTES
Source of History:  patient    Chief complaint:  Suicidal (no plan, no attempt, hx of autism and just d/c from snf house. got into argument with grandma tonight and stated he wanted to kill himself)      HPI:  Addison Sarmiento is a 23 y.o. male presenting with suicidal ideations.  Patient was discharged from a snf house, was brought in by EMS after an argument with his grandma.  Patient states he wants to commit suicide because he feels like he has not of value to his family.  He says he steals things from them and this makes him sad.  He states he wants to run into a wall with his head to kill himself.  States he has tried this before.  He says he would not actually do this because he would not want to this point his younger sisters.  Denies any hallucinations, denies any homicidal ideations.    ROS: As per HPI and below:    General: No fever.  No chills.  Eyes: No visual changes.  Head: No headache.    Integument: No rashes or lesions.  Chest: No shortness of breath.  Cardiovascular: No chest pain.  Abdomen: No abdominal pain.  No nausea or vomiting.  Urinary: No abnormal urination.  Neurologic: No focal weakness.  No numbness.  Hematologic: No easy bruising.  Endocrine: No excessive thirst or urination.      Review of patient's allergies indicates:   Allergen Reactions    Dilantin [phenytoin sodium extended] Rash       No current facility-administered medications on file prior to encounter.      Current Outpatient Medications on File Prior to Encounter   Medication Sig Dispense Refill    ARIPiprazole (ABILIFY) 15 MG Tab Take 1 tablet (15 mg total) by mouth every evening. 30 tablet 0    levothyroxine (SYNTHROID) 25 MCG tablet Take 1 tablet (25 mcg total) by mouth before breakfast. 30 tablet 1    lisdexamfetamine (VYVANSE) 70 MG capsule Take 70 mg by mouth every morning.      venlafaxine (EFFEXOR-XR) 150 MG Cp24 Take 1 capsule (150 mg total) by mouth once daily. 30 capsule 0       PMH:  As per HPI  and below:  Past Medical History:   Diagnosis Date    ADHD     Anxiety     Autism     Depression     Disruptive behavior disorder     History of psychiatric hospitalization     Hx of psychiatric care     Psychiatric problem     Suicide attempt     Therapy     Thyroid disease      Past Surgical History:   Procedure Laterality Date    EYE SURGERY         Social History     Socioeconomic History    Marital status: Single     Spouse name: Not on file    Number of children: 0    Years of education: Not on file    Highest education level: Not on file   Occupational History     Comment: disabled   Social Needs    Financial resource strain: Not on file    Food insecurity:     Worry: Not on file     Inability: Not on file    Transportation needs:     Medical: Not on file     Non-medical: Not on file   Tobacco Use    Smoking status: Never Smoker    Smokeless tobacco: Never Used   Substance and Sexual Activity    Alcohol use: No    Drug use: No    Sexual activity: Never   Lifestyle    Physical activity:     Days per week: Not on file     Minutes per session: Not on file    Stress: Not on file   Relationships    Social connections:     Talks on phone: Not on file     Gets together: Not on file     Attends Hinduism service: Not on file     Active member of club or organization: Not on file     Attends meetings of clubs or organizations: Not on file     Relationship status: Not on file   Other Topics Concern    Patient feels they ought to cut down on drinking/drug use No    Patient annoyed by others criticizing their drinking/drug use No    Patient has felt bad or guilty about drinking/drug use No    Patient has had a drink/used drugs as an eye opener in the AM No   Social History Narrative    Not on file       Family History   Problem Relation Age of Onset    Bipolar disorder Mother        Physical Exam:    Vitals:    09/12/19 2223   BP: (!) 122/98   Pulse: 92   Resp: 18   Temp: 98.2 °F (36.8  °C)     Appearance: No acute distress.  Skin: No rashes seen.  Good turgor.  No abrasions.  No ecchymoses.  Eyes: No conjunctival injection.  ENT: Oropharynx clear.    Chest: Clear to auscultation bilaterally.  Good air movement.  No wheezes.  No rhonchi.  Cardiovascular: Regular rate and rhythm.  No murmurs. No gallops. No rubs.  Abdomen: Soft.  Not distended.  Nontender.  No guarding.  No rebound. No Masses  Musculoskeletal: Good range of motion all joints.  No deformities.  Neck supple.  No meningismus.  Neurologic: Moves all extremities.  Normal sensation.  No facial droop.  Normal speech.    Mental Status:  Alert and oriented x 3.  Appropriate, conversant.  No obvious active hallucinations.          Laboratory Studies:  Labs Reviewed   CBC W/ AUTO DIFFERENTIAL - Abnormal; Notable for the following components:       Result Value    Immature Granulocytes 0.7 (*)     Immature Grans (Abs) 0.05 (*)     All other components within normal limits   COMPREHENSIVE METABOLIC PANEL - Abnormal; Notable for the following components:    Glucose 111 (*)     Alkaline Phosphatase 52 (*)     AST 44 (*)      (*)     All other components within normal limits   TSH - Abnormal; Notable for the following components:    TSH 10.421 (*)     All other components within normal limits   ACETAMINOPHEN LEVEL - Abnormal; Notable for the following components:    Acetaminophen (Tylenol), Serum <3.0 (*)     All other components within normal limits   URINALYSIS, REFLEX TO URINE CULTURE    Narrative:     Preferred Collection Type->Urine, Clean Catch   DRUG SCREEN PANEL, URINE EMERGENCY    Narrative:     Preferred Collection Type->Urine, Clean Catch   ALCOHOL,MEDICAL (ETHANOL)   T4, FREE     Chart reviewed:  Previous head psychiatric admission.    Imaging Results    None         Medications Given:  Medications - No data to display    Discussed with:  Patient, Psychiatry    MDM:    23 y.o. male with suicide ideation, however he states he has  no intention of actually following through with this.  He has a history of suicidal ideation as well.  He is medically clear and stable, and I have consulted psychiatry at approximately 2:15 a.m. a.m. for further evaluation.    Update 330:  Discussed with Psychiatry, they recommend continuing pec.  When they discussed with the patient, he states that he was actually being in his head against a wall an attempt to commit suicide earlier today.  Patient is medically clear, will begin search for inpatient treatment facility.    Diagnostic Impression:    No diagnosis found.           Stevan Feliciano MD  09/13/19 0354

## 2019-09-13 NOTE — ED NOTES
Rounding on the patient has been done. he has been updated on the plan of care and his current status.  Comfort positioning and restroom needs were addressed.  The patient is resting comfortably on the stretcher, respirations are even and unlabored, skin warm and dry. Sitter outside room maintaining visual contact with patient.

## 2019-09-13 NOTE — ED NOTES
Pt escorted out of ED to [facility] by RN and security. NAD noted upon departure. Original PEC and pt belongings with pt. Pt stable for transfer.

## 2021-10-09 ENCOUNTER — HOSPITAL ENCOUNTER (EMERGENCY)
Facility: HOSPITAL | Age: 26
Discharge: HOME | End: 2021-10-09
Attending: EMERGENCY MEDICINE
Payer: MEDICAID

## 2021-10-09 VITALS
RESPIRATION RATE: 19 BRPM | SYSTOLIC BLOOD PRESSURE: 118 MMHG | WEIGHT: 217.81 LBS | HEIGHT: 64 IN | HEART RATE: 105 BPM | TEMPERATURE: 98 F | OXYGEN SATURATION: 96 % | DIASTOLIC BLOOD PRESSURE: 76 MMHG | BODY MASS INDEX: 37.19 KG/M2

## 2021-10-09 DIAGNOSIS — L03.213 PRESEPTAL CELLULITIS OF RIGHT UPPER EYELID: Primary | ICD-10-CM

## 2021-10-09 LAB
ALBUMIN SERPL-MCNC: 4 G/DL (ref 3.3–5.5)
ALP SERPL-CCNC: 39 U/L (ref 42–141)
BILIRUB SERPL-MCNC: 1.2 MG/DL (ref 0.2–1.6)
BUN SERPL-MCNC: 12 MG/DL (ref 7–22)
CALCIUM SERPL-MCNC: 9.7 MG/DL (ref 8–10.3)
CHLORIDE SERPL-SCNC: 105 MMOL/L (ref 98–108)
CREAT SERPL-MCNC: 1.1 MG/DL (ref 0.6–1.2)
GLUCOSE SERPL-MCNC: 112 MG/DL (ref 73–118)
POC ALT (SGPT): 68 U/L (ref 10–47)
POC AST (SGOT): 45 U/L (ref 11–38)
POC TCO2: 27 MMOL/L (ref 18–33)
POTASSIUM BLD-SCNC: 4.1 MMOL/L (ref 3.6–5.1)
PROTEIN, POC: 7.5 G/DL (ref 6.4–8.1)
SODIUM BLD-SCNC: 143 MMOL/L (ref 128–145)

## 2021-10-09 PROCEDURE — 85025 COMPLETE CBC W/AUTO DIFF WBC: CPT | Mod: ER

## 2021-10-09 PROCEDURE — 99284 EMERGENCY DEPT VISIT MOD MDM: CPT | Mod: ER

## 2021-10-09 PROCEDURE — 80053 COMPREHEN METABOLIC PANEL: CPT | Mod: ER

## 2021-10-09 RX ORDER — ONDANSETRON 4 MG/1
4 TABLET, ORALLY DISINTEGRATING ORAL EVERY 8 HOURS PRN
Qty: 12 TABLET | Refills: 0 | Status: SHIPPED | OUTPATIENT
Start: 2021-10-09

## 2021-10-09 RX ORDER — SULFAMETHOXAZOLE AND TRIMETHOPRIM 800; 160 MG/1; MG/1
1 TABLET ORAL 2 TIMES DAILY
Qty: 14 TABLET | Refills: 0 | Status: SHIPPED | OUTPATIENT
Start: 2021-10-09 | End: 2021-10-16

## 2021-10-09 RX ORDER — AMOXICILLIN 875 MG/1
875 TABLET, FILM COATED ORAL 2 TIMES DAILY
Qty: 14 TABLET | Refills: 0 | Status: SHIPPED | OUTPATIENT
Start: 2021-10-09

## 2021-10-09 RX ORDER — SULFAMETHOXAZOLE AND TRIMETHOPRIM 800; 160 MG/1; MG/1
1 TABLET ORAL
Status: DISCONTINUED | OUTPATIENT
Start: 2021-10-09 | End: 2021-10-09

## 2023-04-17 NOTE — PLAN OF CARE
Florentino Thakur (:  1980) is a 41 y.o. male,Established patient, here for evaluation of the following chief complaint(s):  Employment Physical ([employer] pre-employment )    Has not started working at company yet. ASSESSMENT/PLAN:  1. Pre-employment examination  -     Urine Drug Screen; Future  -     CBC with Auto Differential; Future  -     AMB POC URINALYSIS DIP STICK AUTO W/O MICRO  -     Comprehensive Metabolic Panel; Future  -     TSH; Future  -     Lipid Panel; Future  -     81472 - [place] BREATHING CAPACITY TEST  -     16406 - [place] PURE TONE AUDIOMETRY, AIR  -     XR CHEST LATERAL; Future    Cleared for work, with no restrictions. Anticipatory guidance for age-seatbelts, avoid cell phone use in car (may use hands free), no texting while driving, avoid social drugs and tobacco, limit alcohol, fall safety, personal safety. Encourage healthy diet and exercise daily. Is up to date with both psych and PCP. Hearing normal.   Spirometry normal.       No follow-ups on file. Subjective   SUBJECTIVE/OBJECTIVE:  Pre-Employment:  Will be working as a . Employee has a history of ADHD and bipolar. Is stable on medications. Seeing psych. Employee denies hypertension, heart disease, diabetes, seizure disorder, asthma and sleep apnea. Denies daytime fatigue. Denies any orthopedic issues and can climb ladders and stairs without problems. Denies back and neck issues. Has no implantable cardiac devices. Presently not on insulin. Is on Adderall for his ADHD. Is stable on his medications. Employee does exercise regularly. Denies alcohol use. Denies social drug use. Denies tobacco--quit years ago. Employee occassionally drinks carbonated beverages, caffinated products. Employee is up to date with preventative care with his PCP.          Allergies   Allergen Reactions    Latex Rash     Current Outpatient Medications   Medication Sig    ALPRAZolam (XANAX) 1 MG tablet Take Problem: Overarching Goals (Adult)  Goal: Develops/Participates in Therapeutic [place] to Support Successful Transition    Intervention: Mutually Develop Transition Plan  Collateral Contact:   Left a message for Aure Dunbar,   [phone]. Will try again later.

## 2024-02-09 ENCOUNTER — HOSPITAL ENCOUNTER (EMERGENCY)
Facility: HOSPITAL | Age: 29
Discharge: LEFT BEFORE DISCHARGE | End: 2024-02-09
Payer: MEDICAID

## 2024-02-09 VITALS
DIASTOLIC BLOOD PRESSURE: 86 MMHG | RESPIRATION RATE: 18 BRPM | OXYGEN SATURATION: 98 % | WEIGHT: 220 LBS | HEART RATE: 82 BPM | TEMPERATURE: 98 F | SYSTOLIC BLOOD PRESSURE: 125 MMHG | BODY MASS INDEX: 36.65 KG/M2 | HEIGHT: 65 IN

## 2024-02-09 PROCEDURE — 99283 EMERGENCY DEPT VISIT LOW MDM: CPT

## 2024-02-09 PROCEDURE — 25000003 PHARM REV CODE 250: Performed by: PHYSICIAN ASSISTANT

## 2024-02-09 RX ORDER — IBUPROFEN 600 MG/1
600 TABLET ORAL
Status: COMPLETED | OUTPATIENT
Start: 2024-02-09 | End: 2024-02-09

## 2024-02-09 RX ADMIN — IBUPROFEN 600 MG: 600 TABLET, FILM COATED ORAL at 07:02

## 2024-02-10 NOTE — FIRST PROVIDER EVALUATION
Emergency Department TeleTriage Encounter Note      CHIEF COMPLAINT    Chief Complaint   Patient presents with    Dental Pain     R side for 1-2 wks       VITAL SIGNS   Initial Vitals [02/09/24 1808]   BP Pulse Resp Temp SpO2   (!) 132/96 81 17 98.3 °F (36.8 °C) 95 %      MAP       --            ALLERGIES    Review of patient's allergies indicates:   Allergen Reactions    Dilantin [phenytoin sodium extended] Rash       PROVIDER TRIAGE NOTE  Patient presents with dental pain on the right upper jaw.       ORDERS  Labs Reviewed - No data to display    ED Orders (720h ago, onward)      None              Virtual Visit Note: The provider triage portion of this emergency department evaluation and documentation was performed via [employer], a HIPAA-compliant telemedicine application, in concert with a tele-presenter in the room. A face to face patient evaluation with one of my colleagues will occur once the patient is placed in an emergency department room.      DISCLAIMER: This note was prepared with [employer] voice recognition transcription software. Garbled syntax, mangled pronouns, and other bizarre constructions may be attributed to that software system.

## 2025-01-05 ENCOUNTER — HOSPITAL ENCOUNTER (EMERGENCY)
Facility: HOSPITAL | Age: 30
Discharge: HOME | End: 2025-01-05
Attending: EMERGENCY MEDICINE
Payer: MEDICAID

## 2025-01-05 VITALS
HEART RATE: 104 BPM | WEIGHT: 220 LBS | SYSTOLIC BLOOD PRESSURE: 132 MMHG | RESPIRATION RATE: 20 BRPM | TEMPERATURE: 100 F | BODY MASS INDEX: 36.65 KG/M2 | HEIGHT: 65 IN | DIASTOLIC BLOOD PRESSURE: 85 MMHG | OXYGEN SATURATION: 97 %

## 2025-01-05 DIAGNOSIS — J10.1 INFLUENZA A: Primary | ICD-10-CM

## 2025-01-05 DIAGNOSIS — R05.9 COUGH: ICD-10-CM

## 2025-01-05 LAB
CTP QC/QA: YES
INFLUENZA A ANTIGEN, POC: POSITIVE
INFLUENZA B ANTIGEN, POC: NEGATIVE
POC RAPID STREP A: NEGATIVE
SARS-COV-2 RDRP RESP QL NAA+PROBE: NEGATIVE

## 2025-01-05 PROCEDURE — 87804 INFLUENZA ASSAY W/OPTIC: CPT | Mod: 59,ER

## 2025-01-05 PROCEDURE — 87880 STREP A ASSAY W/OPTIC: CPT | Mod: ER

## 2025-01-05 PROCEDURE — 99283 EMERGENCY DEPT VISIT LOW MDM: CPT | Mod: 25,ER

## 2025-01-05 PROCEDURE — 25000003 PHARM REV CODE 250: Mod: ER

## 2025-01-05 PROCEDURE — 87635 SARS-COV-2 COVID-19 AMP PRB: CPT | Mod: ER | Performed by: EMERGENCY MEDICINE

## 2025-01-05 RX ORDER — IBUPROFEN 600 MG/1
600 TABLET ORAL
Status: COMPLETED | OUTPATIENT
Start: 2025-01-05 | End: 2025-01-05

## 2025-01-05 RX ORDER — OSELTAMIVIR PHOSPHATE 75 MG/1
75 CAPSULE ORAL 2 TIMES DAILY
Qty: 10 CAPSULE | Refills: 0 | Status: SHIPPED | OUTPATIENT
Start: 2025-01-05 | End: 2025-01-10

## 2025-01-05 RX ADMIN — IBUPROFEN 600 MG: 600 TABLET ORAL at 04:01

## 2025-01-05 NOTE — ED PROVIDER NOTES
Encounter Date: 1/5/2025    SCRIBE #1 NOTE: I, Lovely Otero, am scribing for, and in the presence of,  Benjamin Taveras PA-C. I have scribed the following portions of the note - Other sections scribed: HPI, ROS.       History     Chief Complaint   Patient presents with    Nasal Congestion     Nasal congestion, left ear pain x 1 day.       29 y.o. male, with PMHx of ADHD, Autism, and thyroid disease, presents to the ED complaining of nasal congestion onset today. He reports associated rhinorrhea, cough, sore throat, and decreased appetite. He also reports mid-sternal chest pain secondary to cough. He attempted treatment with Tylenol around 12:00 pm without relief of symptoms. Denies any known sick contacts. He denies any fever, hemoptysis, dysphagia, nausea, vomiting, diarrhea, dysuria, or other associated symptoms. Reports drug allergy to Dilantin.  He denies any recent long distance travel, surgeries, hormone therapy.      The history is provided by the patient. No  was used.     Review of patient's allergies indicates:   Allergen Reactions    Dilantin [phenytoin sodium extended] Rash     Past Medical History:   Diagnosis Date    ADHD     Anxiety     Autism     Depression     Disruptive behavior disorder     History of psychiatric hospitalization     Hx of psychiatric care     Psychiatric problem     Suicide attempt     Therapy     Thyroid disease      Past Surgical History:   Procedure Laterality Date    EYE SURGERY       Family History   Problem Relation Name Age of Onset    Bipolar disorder Mother       Social History     Tobacco Use    Smoking status: Never    Smokeless tobacco: Never   Substance Use Topics    Alcohol use: No    Drug use: No     Review of Systems   Constitutional:  Positive for appetite change (decreased). Negative for diaphoresis, fatigue and fever.   HENT:  Positive for congestion, rhinorrhea and sore throat. Negative for ear pain, nosebleeds and trouble swallowing.     Eyes:  Negative for pain and redness.   Respiratory:  Positive for cough. Negative for shortness of breath.         (-) hemoptysis   Cardiovascular:  Positive for chest pain (2/2 cough). Negative for leg swelling.   Gastrointestinal:  Negative for abdominal pain, diarrhea, nausea and vomiting.   Genitourinary:  Negative for dysuria and hematuria.   Musculoskeletal:  Negative for joint swelling and myalgias.   Skin:  Negative for rash and wound.   Neurological:  Negative for seizures, syncope, weakness and headaches.   Psychiatric/Behavioral:  Negative for confusion. The patient is not nervous/anxious.        Physical Exam     Initial Vitals [01/05/25 1541]   BP Pulse Resp Temp SpO2   132/85 104 20 99.5 °F (37.5 °C) 97 %      MAP       --         Physical Exam    Nursing note and vitals reviewed.  Constitutional: He appears well-developed and well-nourished. He is not diaphoretic.  Non-toxic appearance. No distress.   HENT:   Head: Normocephalic and atraumatic.   Right Ear: Hearing, tympanic membrane, external ear and ear canal normal. Tympanic membrane is not perforated, not erythematous and not bulging.   Left Ear: Hearing, tympanic membrane, external ear and ear canal normal. Tympanic membrane is not perforated, not erythematous and not bulging.   Nose: Nose normal. Mouth/Throat: Uvula is midline and oropharynx is clear and moist.   Full range motion of jaw.  No trismus.  Airway intact.  Speaking in full sentences.  No hot potato voice.     Eyes: Conjunctivae and EOM are normal.   Neck: Neck supple.   Normal range of motion.   Full passive range of motion without pain.     Cardiovascular:            Pulses:       Radial pulses are 2+ on the right side and 2+ on the left side.   Pulmonary/Chest:   Midsternal chest wall tenderness on palpation.  No surrounding erythema or cellulitis.  Equal distal pulses bilaterally.   Musculoskeletal:      Cervical back: Full passive range of motion without pain, normal range of  motion and neck supple. No rigidity.     Neurological: He is alert. No cranial nerve deficit.   Neuro intact.  Strength and sensation intact to bilateral upper and lower extremities.   Skin: Skin is warm and dry. No rash noted.         ED Course   Procedures  Labs Reviewed   POCT RAPID INFLUENZA A/B - Abnormal       Result Value    Influenza B Ag negative      Inflenza A Ag positive (*)    SARS-COV-2 RDRP GENE    POC Rapid COVID Negative       Acceptable Yes     POCT STREP A, RAPID    POC Rapid Strep A negative            Imaging Results              X-Ray Chest PA And Lateral (Final result)  Result time 01/05/25 17:34:06      Final result by David Pro MD (01/05/25 17:34:06)                   Impression:      1. No acute cardiopulmonary process.      Electronically signed by: David Pro MD  Date:    01/05/2025  Time:    17:34               Narrative:    EXAMINATION:  XR CHEST PA AND LATERAL    CLINICAL HISTORY:  Cough, unspecified    TECHNIQUE:  PA and lateral views of the chest were performed.    COMPARISON:  None    FINDINGS:  The cardiomediastinal silhouette is not enlarged.  There is no pleural effusion.  The trachea is midline.  The lungs are symmetrically expanded bilaterally without evidence of acute parenchymal process. No large focal consolidation seen.  There is no pneumothorax.  The osseous structures are unremarkable.                                       Medications   ibuprofen tablet 600 mg (600 mg Oral Given 1/5/25 1623)     Medical Decision Making  This is a 29 y.o. male, with PMHx of ADHD, Autism, and thyroid disease, presents to the ED complaining of nasal congestion onset today. He reports associated rhinorrhea, cough, sore throat, and decreased appetite. He also reports mid-sternal chest pain secondary to cough. He attempted treatment with Tylenol around 12:00 pm without relief of symptoms. Denies any known sick contacts. He denies any fever, hemoptysis, dysphagia,  nausea, vomiting, diarrhea, dysuria, or other associated symptoms. Reports drug allergy to Dilantin.  He denies any recent long distance travel, surgeries, hormone therapy.    On physical exam, patient is well-appearing and in no acute distress.  Nontoxic appearing.  Lungs are clear to auscultation bilaterally.  Abdomen is soft and nontender.  No guarding, rigidity, rebound.  2+ radial pulses bilaterally.  Posterior oropharynx is not erythematous.  No edema or exudate.  Uvula midline.  Bilateral tympanic membrane is normal.  No erythema, bulging, or perforations.  Neuro intact.  Strength and sensation intact bilateral upper and lower extremities.  Full range motion of jaw.  No trismus.  Airway intact.  Speaking in full sentences.  No hot potato voice.  Midsternal chest wall tenderness on palpation.  No surrounding erythema or cellulitis.  Equal distal pulses bilaterally.  Ibuprofen ordered.  COVID and strep negative.  Flu A positive.  Chest x-ray revealed no acute cardiopulmonary process.    I was informed by my attending, Dr. Hurley, that the patient was requesting to leave while I was in a room seeing another patient.  She discharged him on Tamiflu.    Strict return precautions given. I discussed with the patient/family the diagnosis, treatment plan, indications for return to the emergency department, and for expected follow-up. The patient/family verbalized an understanding. The patient/family is asked if there are any questions or concerns. We discuss the case, until all issues are addressed to the patient/family's satisfaction. Patient/family understands and is agreeable to the plan. Patient is stable and ready for discharge.      Amount and/or Complexity of Data Reviewed  Labs: ordered. Decision-making details documented in ED Course.  Radiology: ordered. Decision-making details documented in ED Course.    Risk  Prescription drug management.            Scribe Attestation:   Scribe #1: I performed the above  scribed service and the documentation accurately describes the services I performed. I attest to the accuracy of the note.                             I, Benjamin Taveras, personally performed the services described in this documentation. All medical record entries made by the scribe were at my direction and in my presence. I have reviewed the chart and agree that the record reflects my personal performance and is accurate and complete.      DISCLAIMER: This note was prepared with [employer] voice recognition transcription software. Garbled syntax, mangled pronouns, and other bizarre constructions may be attributed to that software system.    Clinical Impression:  Final diagnoses:  [R05.9] Cough  [J10.1] Influenza A (Primary)          ED Disposition Condition    Discharge Stable          ED Prescriptions       Medication Sig Dispense Start Date End Date Auth. Provider    oseltamivir (TAMIFLU) 75 MG capsule Take 1 capsule (75 mg total) by mouth 2 (two) times daily. for 5 days 10 capsule 1/5/2025 1/10/2025 Cassia Hurley MD          Follow-up Information       Follow up With Specialties Details Why Contact Info    Blanche Leach MD Internal Medicine Schedule an appointment as soon as possible for a visit in 1 week  [address] [facility] [place] [address]  [phone]               Benjamin Taveras PA-C  01/06/25 1037

## 2025-01-05 NOTE — Clinical Note
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  He may return to work on 01/11/2025.       If you have any questions or concerns, please don't hesitate to call.      Cassia Hurley MD"

## 2025-07-08 ENCOUNTER — HOSPITAL ENCOUNTER (EMERGENCY)
Facility: HOSPITAL | Age: 30
Discharge: HOME | End: 2025-07-08
Attending: EMERGENCY MEDICINE
Payer: MEDICAID

## 2025-07-08 VITALS
RESPIRATION RATE: 20 BRPM | TEMPERATURE: 99 F | HEIGHT: 64 IN | WEIGHT: 220 LBS | HEART RATE: 91 BPM | SYSTOLIC BLOOD PRESSURE: 113 MMHG | OXYGEN SATURATION: 98 % | DIASTOLIC BLOOD PRESSURE: 79 MMHG | BODY MASS INDEX: 37.56 KG/M2

## 2025-07-08 DIAGNOSIS — L03.213 PERIORBITAL CELLULITIS OF RIGHT EYE: ICD-10-CM

## 2025-07-08 DIAGNOSIS — H01.001 BLEPHARITIS OF RIGHT UPPER EYELID, UNSPECIFIED TYPE: Primary | ICD-10-CM

## 2025-07-08 PROCEDURE — 99284 EMERGENCY DEPT VISIT MOD MDM: CPT | Mod: ER

## 2025-07-08 PROCEDURE — 25000003 PHARM REV CODE 250: Mod: ER | Performed by: EMERGENCY MEDICINE

## 2025-07-08 RX ORDER — ERYTHROMYCIN 5 MG/G
OINTMENT OPHTHALMIC
Qty: 3.5 G | Refills: 0 | Status: SHIPPED | OUTPATIENT
Start: 2025-07-08

## 2025-07-08 RX ORDER — AMOXICILLIN AND CLAVULANATE POTASSIUM 875; 125 MG/1; MG/1
1 TABLET, FILM COATED ORAL 2 TIMES DAILY
Qty: 14 TABLET | Refills: 0 | Status: SHIPPED | OUTPATIENT
Start: 2025-07-08 | End: 2025-07-15

## 2025-07-08 RX ORDER — ERYTHROMYCIN 5 MG/G
OINTMENT OPHTHALMIC
Status: COMPLETED | OUTPATIENT
Start: 2025-07-08 | End: 2025-07-08

## 2025-07-08 RX ADMIN — ERYTHROMYCIN: 5 OINTMENT OPHTHALMIC at 05:07

## 2025-07-08 NOTE — ED PROVIDER NOTES
Encounter Date: 7/8/2025    SCRIBE #1 NOTE: I, Avtar Carreon Do, am scribing for, and in the presence of,  Monica Anderson MD. I have scribed the following portions of the note - Other sections scribed: HPI, ROS, PE, MDM.       History     Chief Complaint   Patient presents with    Facial Swelling     Right eye swelling occurring this am        29-year-old male with a pertinent past medical history of anxiety, autism spectrum disorder, and hyperlipidemia, presents to the ED with swelling around the right eye that began yesterday morning after waking up. Patient reports attempted treatment with warm compresses to the eye without significant relief. Patient reports waking up this morning with similar swelling which prompted him to the ED. Patient denies a history of diabetes and hypertension. No other exacerbating or alleviating factors. Patient denies any right eye pain, vision changes, right eye discharge, or other associated symptoms. Patient reports a known Dilantin allergy, however denies any known allergy to antibiotics.    The history is provided by the patient. No  was used.     Review of patient's allergies indicates:   Allergen Reactions    Dilantin [phenytoin sodium extended] Rash     Past Medical History:   Diagnosis Date    ADHD     Anxiety     Autism     Depression     Disruptive behavior disorder     History of psychiatric hospitalization     Hx of psychiatric care     Psychiatric problem     Suicide attempt     Therapy     Thyroid disease      Past Surgical History:   Procedure Laterality Date    EYE SURGERY       Family History   Problem Relation Name Age of Onset    Bipolar disorder Mother       Social History[1]  Review of Systems   Constitutional:  Negative for activity change, appetite change, chills and fever.   HENT:  Negative for congestion, facial swelling, rhinorrhea, sneezing and sore throat.    Eyes:  Positive for redness. Negative for pain, discharge and visual disturbance.    Respiratory:  Negative for cough, chest tightness, shortness of breath and wheezing.    Cardiovascular:  Negative for chest pain and palpitations.   Gastrointestinal:  Negative for abdominal pain, diarrhea, nausea and vomiting.   Skin:  Negative for rash.   Neurological:  Negative for dizziness, light-headedness and headaches.   All other systems reviewed and are negative.      Physical Exam     Initial Vitals [07/08/25 1600]   BP Pulse Resp Temp SpO2   113/79 91 20 98.6 °F (37 °C) 98 %      MAP       --         Physical Exam    Nursing note and vitals reviewed.  Constitutional: He appears well-developed and well-nourished. No distress.   HENT:   Head: Normocephalic and atraumatic.   Eyes: Right conjunctiva is injected. Left conjunctiva is not injected.   There is right upper eyelid edema, erythema, and tenderness. There is right periorbital erythema. No right eye discharge.   Neck:   Normal range of motion.  Cardiovascular:  Normal rate and regular rhythm.           No murmur heard.  Pulmonary/Chest: Breath sounds normal. No respiratory distress.   Abdominal: Bowel sounds are normal. He exhibits no distension. There is no abdominal tenderness.   Musculoskeletal:         General: No tenderness or edema. Normal range of motion.      Cervical back: Normal range of motion.     Neurological: He is alert and oriented to person, place, and time.   Skin: Skin is warm and dry. No rash noted.   Psychiatric: He has a normal mood and affect. His behavior is normal.         ED Course   Procedures  Labs Reviewed - No data to display       Imaging Results    None          Medications   erythromycin 5 mg/gram (0.5 %) ophthalmic ointment ( Right Eye Given 7/8/25 8122)     Medical Decision Making  29-year-old male with a pertinent past medical history of anxiety, autism spectrum disorder, and hyperlipidemia, who presents to the ED with swelling around the right eye that began yesterday morning after waking up. On exam, There is  right upper eyelid edema, erythema, and tenderness. There is right periorbital erythema. Right eye conjunctival injection without discharge. In shared decision making with the patient, I will treat blepharitis and possible early periorbital cellulitis with erythromycin ophthalmic ointment and oral augmentin.      Risk  Prescription drug management.  Diagnosis or treatment significantly limited by social determinants of health.            Scribe Attestation:   Scribe #1: I performed the above scribed service and the documentation accurately describes the services I performed. I attest to the accuracy of the note.                               Clinical Impression:  Final diagnoses:  [H01.001] Blepharitis of right upper eyelid, unspecified type (Primary)  [L03.213] Periorbital cellulitis of right eye       I, Dr. Monica Anderson, personally performed the services described in this documentation.   All medical record entries made by the scribe were at my direction and in my presence.   I have reviewed the chart and agree that the record is accurate and complete.   Monica Anderson MD.  5:20 PM 07/08/2025      ED Disposition Condition    Discharge Stable          ED Prescriptions       Medication Sig Dispense Start Date End Date Auth. Provider    erythromycin (ROMYCIN) ophthalmic ointment Place ointment to upper lid margin 4 times a day until healed. 3.5 g 7/8/2025 -- Monica Anderson MD    amoxicillin-clavulanate 875-125mg (AUGMENTIN) 875-125 mg per tablet Take 1 tablet by mouth 2 (two) times daily. for 7 days 14 tablet 7/8/2025 7/15/2025 Monica Anderson MD          Follow-up Information    None                [1]   Social History  Tobacco Use    Smoking status: Never    Smokeless tobacco: Never   Substance Use Topics    Alcohol use: No    Drug use: No        Monica Anderson MD  07/08/25 5194

## 2025-07-08 NOTE — DISCHARGE INSTRUCTIONS
Apply ointment to the upper lid margin 4 times daily until healed. Take the antibiotics as directed. Apply warm compress for 10 minutes 4 times a day. If you have worsening symptoms, see an EYE doctor.

## 2025-08-25 ENCOUNTER — HOSPITAL ENCOUNTER (EMERGENCY)
Facility: HOSPITAL | Age: 30
Discharge: HOME | End: 2025-08-25
Attending: EMERGENCY MEDICINE
Payer: COMMERCIAL

## 2025-08-25 VITALS
OXYGEN SATURATION: 98 % | WEIGHT: 211 LBS | BODY MASS INDEX: 36.02 KG/M2 | HEIGHT: 64 IN | DIASTOLIC BLOOD PRESSURE: 66 MMHG | HEART RATE: 76 BPM | RESPIRATION RATE: 18 BRPM | SYSTOLIC BLOOD PRESSURE: 121 MMHG | TEMPERATURE: 98 F

## 2025-08-25 DIAGNOSIS — K52.9 GASTROENTERITIS: Primary | ICD-10-CM

## 2025-08-25 DIAGNOSIS — R45.89 ANXIETY ABOUT HEALTH: ICD-10-CM

## 2025-08-25 PROCEDURE — 99284 EMERGENCY DEPT VISIT MOD MDM: CPT | Mod: ER

## 2025-08-25 RX ORDER — ONDANSETRON 8 MG/1
8 TABLET, ORALLY DISINTEGRATING ORAL EVERY 6 HOURS PRN
Qty: 12 TABLET | Refills: 0 | Status: SHIPPED | OUTPATIENT
Start: 2025-08-25 | End: 2025-08-28

## 2025-08-25 RX ORDER — FAMOTIDINE 20 MG/1
20 TABLET, FILM COATED ORAL 2 TIMES DAILY
Qty: 20 TABLET | Refills: 0 | Status: SHIPPED | OUTPATIENT
Start: 2025-08-25 | End: 2026-08-25

## 2025-08-25 RX ORDER — ACETAMINOPHEN 500 MG
500 TABLET ORAL EVERY 6 HOURS PRN
Qty: 30 TABLET | Refills: 0 | Status: SHIPPED | OUTPATIENT
Start: 2025-08-25

## 2025-08-25 RX ORDER — DICYCLOMINE HYDROCHLORIDE 20 MG/1
20 TABLET ORAL 3 TIMES DAILY PRN
Qty: 20 TABLET | Refills: 0 | Status: SHIPPED | OUTPATIENT
Start: 2025-08-25 | End: 2025-09-24